# Patient Record
Sex: FEMALE | Race: WHITE | NOT HISPANIC OR LATINO | Employment: FULL TIME | URBAN - METROPOLITAN AREA
[De-identification: names, ages, dates, MRNs, and addresses within clinical notes are randomized per-mention and may not be internally consistent; named-entity substitution may affect disease eponyms.]

---

## 2017-05-08 ENCOUNTER — HOSPITAL ENCOUNTER (OUTPATIENT)
Dept: FAMILY MEDICINE CLINIC | Facility: CLINIC | Age: 42
Setting detail: SPECIMEN
Discharge: HOME | End: 2017-05-08
Attending: FAMILY MEDICINE | Admitting: FAMILY MEDICINE

## 2017-05-08 LAB
T3 SERPL-MCNC: 0.93 NG/ML (ref 0.87–1.78)
T4 FREE SERPL-MCNC: 0.84 NG/DL (ref 0.58–1.64)
T4 SERPL-MCNC: 8.35 UG/DL (ref 6.1–12.2)
TSH SERPL-ACNC: 3.98 UIU/ML (ref 0.34–5.6)

## 2017-05-15 ENCOUNTER — OFFICE (OUTPATIENT)
Dept: URBAN - METROPOLITAN AREA CLINIC 64 | Facility: CLINIC | Age: 42
End: 2017-05-15
Payer: COMMERCIAL

## 2017-05-15 VITALS
HEART RATE: 76 BPM | HEIGHT: 65 IN | DIASTOLIC BLOOD PRESSURE: 74 MMHG | SYSTOLIC BLOOD PRESSURE: 109 MMHG | WEIGHT: 160 LBS

## 2017-05-15 DIAGNOSIS — R14.0 ABDOMINAL DISTENSION (GASEOUS): ICD-10-CM

## 2017-05-15 DIAGNOSIS — K58.9 IRRITABLE BOWEL SYNDROME WITHOUT DIARRHEA: ICD-10-CM

## 2017-05-15 PROCEDURE — 99243 OFF/OP CNSLTJ NEW/EST LOW 30: CPT | Performed by: INTERNAL MEDICINE

## 2017-05-15 RX ORDER — DICYCLOMINE HYDROCHLORIDE 10 MG/1
30 CAPSULE ORAL
Qty: 90 | Refills: 11 | Status: COMPLETED
Start: 2017-05-15 | End: 2018-01-02

## 2017-05-15 RX ORDER — ALUMINUM ZIRCONIUM OCTACHLOROHYDREX GLY 16 G/100G
4 GEL TOPICAL
Qty: 30 | Refills: 5 | Status: COMPLETED
Start: 2017-05-15 | End: 2023-07-26

## 2017-06-07 ENCOUNTER — HOSPITAL ENCOUNTER (OUTPATIENT)
Dept: PAIN MEDICINE | Facility: HOSPITAL | Age: 42
Discharge: HOME | End: 2017-06-07
Attending: ANESTHESIOLOGY | Admitting: ANESTHESIOLOGY

## 2017-06-13 ENCOUNTER — HOSPITAL ENCOUNTER (OUTPATIENT)
Dept: PAIN MEDICINE | Facility: HOSPITAL | Age: 42
Discharge: HOME | End: 2017-06-13
Attending: ANESTHESIOLOGY | Admitting: ANESTHESIOLOGY

## 2017-07-05 ENCOUNTER — HOSPITAL ENCOUNTER (OUTPATIENT)
Dept: PAIN MEDICINE | Facility: HOSPITAL | Age: 42
Discharge: HOME | End: 2017-07-05
Attending: ANESTHESIOLOGY | Admitting: ANESTHESIOLOGY

## 2017-09-05 ENCOUNTER — HOSPITAL ENCOUNTER (OUTPATIENT)
Dept: PAIN MEDICINE | Facility: HOSPITAL | Age: 42
Discharge: HOME | End: 2017-09-05
Attending: ANESTHESIOLOGY | Admitting: ANESTHESIOLOGY

## 2017-10-17 ENCOUNTER — HOSPITAL ENCOUNTER (OUTPATIENT)
Dept: PAIN MEDICINE | Facility: HOSPITAL | Age: 42
Discharge: HOME | End: 2017-10-17
Attending: ANESTHESIOLOGY | Admitting: ANESTHESIOLOGY

## 2018-01-02 ENCOUNTER — OFFICE (OUTPATIENT)
Dept: URBAN - METROPOLITAN AREA CLINIC 64 | Facility: CLINIC | Age: 43
End: 2018-01-02

## 2018-01-02 VITALS
SYSTOLIC BLOOD PRESSURE: 109 MMHG | HEART RATE: 76 BPM | HEIGHT: 65 IN | DIASTOLIC BLOOD PRESSURE: 73 MMHG | WEIGHT: 161 LBS

## 2018-01-02 DIAGNOSIS — R14.0 ABDOMINAL DISTENSION (GASEOUS): ICD-10-CM

## 2018-01-02 DIAGNOSIS — K58.9 IRRITABLE BOWEL SYNDROME WITHOUT DIARRHEA: ICD-10-CM

## 2018-01-02 PROCEDURE — 99213 OFFICE O/P EST LOW 20 MIN: CPT | Performed by: INTERNAL MEDICINE

## 2018-01-02 NOTE — SERVICEHPINOTES
VELMA ORDOÑEZ is a 42 female c hx of IBS is being seen in the office today for gas and bloating. She has mild constipation. She mainly has gas and bloating discomfort. She has tried modifying diet. No wt loss, rectal bleeding or melena. No n/v.When last seen, she was started on Align and Bentyl. She states bentyl caused constipation when she was taking it tid. She is unsure if Align helped. She continues to have gas and bloating. She needs work form filled out for bathroom breaks.Yenny 2017 cbc, cmp nl, TSH 7

## 2018-11-09 ENCOUNTER — HOSPITAL ENCOUNTER (OUTPATIENT)
Dept: FAMILY MEDICINE CLINIC | Facility: CLINIC | Age: 43
Setting detail: SPECIMEN
Discharge: HOME | End: 2018-11-09
Attending: FAMILY MEDICINE | Admitting: FAMILY MEDICINE

## 2018-11-09 LAB
ALBUMIN SERPL-MCNC: 4 G/DL (ref 3.5–4.8)
ALBUMIN/GLOB SERPL: 1.3 {RATIO} (ref 1–1.7)
ALP SERPL-CCNC: 90 IU/L (ref 32–91)
ALT SERPL-CCNC: 24 IU/L (ref 14–54)
ANION GAP SERPL CALC-SCNC: 14 MMOL/L (ref 10–20)
AST SERPL-CCNC: 22 IU/L (ref 15–41)
BILIRUB SERPL-MCNC: 0.7 MG/DL (ref 0.3–1.2)
BUN SERPL-MCNC: 13 MG/DL (ref 8–20)
BUN/CREAT SERPL: 14.4 (ref 5.4–26.2)
CALCIUM SERPL-MCNC: 9.4 MG/DL (ref 8.9–10.3)
CHLORIDE SERPL-SCNC: 103 MMOL/L (ref 101–111)
CHOLEST SERPL-MCNC: 233 MG/DL
CHOLEST/HDLC SERPL: 5.7 {RATIO}
CONV CO2: 24 MMOL/L (ref 22–32)
CONV LDL CHOLESTEROL DIRECT: 151 MG/DL (ref 0–100)
CONV TOTAL PROTEIN: 7 G/DL (ref 6.1–7.9)
CREAT UR-MCNC: 0.9 MG/DL (ref 0.4–1)
GLOBULIN UR ELPH-MCNC: 3 G/DL (ref 2.5–3.8)
GLUCOSE SERPL-MCNC: 99 MG/DL (ref 65–99)
HDLC SERPL-MCNC: 41 MG/DL
LDLC/HDLC SERPL: 3.7 {RATIO}
LIPID INTERPRETATION: ABNORMAL
POTASSIUM SERPL-SCNC: 4 MMOL/L (ref 3.6–5.1)
SODIUM SERPL-SCNC: 137 MMOL/L (ref 136–144)
TRIGL SERPL-MCNC: 324 MG/DL
VLDLC SERPL CALC-MCNC: 41.8 MG/DL

## 2019-07-15 RX ORDER — IBUPROFEN 800 MG/1
1 TABLET ORAL EVERY 6 HOURS
COMMUNITY
Start: 2018-02-07 | End: 2019-09-03 | Stop reason: SDUPTHER

## 2019-07-15 NOTE — TELEPHONE ENCOUNTER
Last visit:  11/9/18  Next visit: none  Last labs: 11/9/18    Rx requested: Ibuprofen   [facility]: [facility] [place]

## 2019-07-23 RX ORDER — IBUPROFEN 800 MG/1
TABLET ORAL
Qty: 90 TABLET | Refills: 0 | OUTPATIENT
Start: 2019-07-23

## 2019-08-09 ENCOUNTER — CLINICAL SUPPORT (OUTPATIENT)
Dept: FAMILY MEDICINE CLINIC | Facility: CLINIC | Age: 44
End: 2019-08-09

## 2019-08-09 DIAGNOSIS — E78.2 MIXED HYPERLIPIDEMIA: Primary | ICD-10-CM

## 2019-08-09 DIAGNOSIS — E78.5 HYPERLIPIDEMIA, UNSPECIFIED HYPERLIPIDEMIA TYPE: ICD-10-CM

## 2019-08-09 LAB
ALBUMIN SERPL-MCNC: 4 G/DL (ref 3.5–4.8)
ALBUMIN/GLOB SERPL: 1.5 G/DL (ref 1–1.7)
ALP SERPL-CCNC: 72 U/L (ref 32–91)
ALT SERPL W P-5'-P-CCNC: 17 U/L (ref 14–54)
ANION GAP SERPL CALCULATED.3IONS-SCNC: 12.7 MMOL/L (ref 5–15)
ARTICHOKE IGE QN: 135 MG/DL (ref 0–100)
AST SERPL-CCNC: 22 U/L (ref 15–41)
BILIRUB SERPL-MCNC: 0.7 MG/DL (ref 0.3–1.2)
BUN BLD-MCNC: 15 MG/DL (ref 8–20)
BUN/CREAT SERPL: 15 (ref 5.4–26.2)
CALCIUM SPEC-SCNC: 9.1 MG/DL (ref 8.9–10.3)
CHLORIDE SERPL-SCNC: 108 MMOL/L (ref 101–111)
CHOLEST SERPL-MCNC: 200 MG/DL
CO2 SERPL-SCNC: 23 MMOL/L (ref 22–32)
CREAT BLD-MCNC: 1 MG/DL (ref 0.4–1)
GFR SERPL CREATININE-BSD FRML MDRD: 61 ML/MIN/1.73
GLOBULIN UR ELPH-MCNC: 2.6 GM/DL (ref 2.5–3.8)
GLUCOSE BLD-MCNC: 92 MG/DL (ref 65–99)
HDLC SERPL QL: 6.06
HDLC SERPL-MCNC: 33 MG/DL
LDLC/HDLC SERPL: 3.42 {RATIO}
POTASSIUM BLD-SCNC: 4.7 MMOL/L (ref 3.6–5.1)
PROT SERPL-MCNC: 6.6 G/DL (ref 6.1–7.9)
SODIUM BLD-SCNC: 139 MMOL/L (ref 136–144)
TRIGL SERPL-MCNC: 270 MG/DL
VLDLC SERPL-MCNC: 54 MG/DL

## 2019-08-09 PROCEDURE — 80061 LIPID PANEL: CPT | Performed by: FAMILY MEDICINE

## 2019-08-09 PROCEDURE — 80053 COMPREHEN METABOLIC PANEL: CPT | Performed by: FAMILY MEDICINE

## 2019-08-09 PROCEDURE — 36415 COLL VENOUS BLD VENIPUNCTURE: CPT | Performed by: FAMILY MEDICINE

## 2019-08-09 RX ORDER — CHLORZOXAZONE 500 MG/1
500 TABLET ORAL
COMMUNITY
End: 2019-08-30

## 2019-08-09 RX ORDER — HYDROXYZINE HYDROCHLORIDE 25 MG/1
25 TABLET, FILM COATED ORAL
COMMUNITY
Start: 2018-01-25 | End: 2019-08-30

## 2019-08-09 RX ORDER — SUMATRIPTAN 20 MG/1
SPRAY NASAL
COMMUNITY
Start: 2018-11-09 | End: 2019-08-09 | Stop reason: CLARIF

## 2019-08-09 RX ORDER — FAMOTIDINE 20 MG/1
20 TABLET, FILM COATED ORAL
COMMUNITY
Start: 2018-01-25 | End: 2019-08-30

## 2019-08-09 RX ORDER — ONDANSETRON 4 MG/1
4 TABLET, ORALLY DISINTEGRATING ORAL EVERY 8 HOURS PRN
Qty: 30 TABLET | Refills: 0 | Status: SHIPPED | OUTPATIENT
Start: 2019-08-09 | End: 2019-09-03 | Stop reason: SDUPTHER

## 2019-08-09 RX ORDER — DOXEPIN HYDROCHLORIDE 10 MG/1
1 CAPSULE ORAL EVERY 24 HOURS
COMMUNITY
Start: 2018-11-09 | End: 2020-02-20

## 2019-08-09 RX ORDER — AMITRIPTYLINE HYDROCHLORIDE 10 MG/1
10 TABLET, FILM COATED ORAL DAILY
COMMUNITY
End: 2019-08-30

## 2019-08-09 RX ORDER — ONDANSETRON 4 MG/1
4 TABLET, ORALLY DISINTEGRATING ORAL
COMMUNITY
End: 2019-08-09 | Stop reason: SDUPTHER

## 2019-08-09 RX ORDER — OCTISALATE, AVOBENZONE, HOMOSALATE, AND OCTOCRYLENE 29.4; 29.4; 49; 25.48 MG/ML; MG/ML; MG/ML; MG/ML
LOTION TOPICAL
COMMUNITY
Start: 2017-06-07 | End: 2019-08-30

## 2019-08-09 RX ORDER — TIZANIDINE 4 MG/1
4 TABLET ORAL
COMMUNITY
Start: 2017-06-07 | End: 2020-02-20 | Stop reason: SDUPTHER

## 2019-08-09 RX ORDER — MELOXICAM 15 MG/1
15 TABLET ORAL DAILY
COMMUNITY
Start: 2017-03-20 | End: 2019-09-04

## 2019-08-09 RX ORDER — RIZATRIPTAN BENZOATE 10 MG/1
10 TABLET, ORALLY DISINTEGRATING ORAL
COMMUNITY
Start: 2018-01-08 | End: 2019-08-09 | Stop reason: SDUPTHER

## 2019-08-09 RX ORDER — TRIAMCINOLONE ACETONIDE 1 MG/G
CREAM TOPICAL
COMMUNITY
Start: 2015-05-21 | End: 2020-02-20

## 2019-08-09 RX ORDER — RIZATRIPTAN BENZOATE 10 MG/1
10 TABLET, ORALLY DISINTEGRATING ORAL ONCE AS NEEDED
Qty: 9 TABLET | Refills: 0 | Status: SHIPPED | OUTPATIENT
Start: 2019-08-09 | End: 2019-08-30 | Stop reason: SDUPTHER

## 2019-08-09 RX ORDER — CYCLOBENZAPRINE HCL 10 MG
10 TABLET ORAL
COMMUNITY
Start: 2016-01-28 | End: 2019-08-30

## 2019-08-13 ENCOUNTER — TELEPHONE (OUTPATIENT)
Dept: FAMILY MEDICINE CLINIC | Facility: CLINIC | Age: 44
End: 2019-08-13

## 2019-08-13 DIAGNOSIS — G43.009 MIGRAINE WITHOUT AURA AND WITHOUT STATUS MIGRAINOSUS, NOT INTRACTABLE: Primary | ICD-10-CM

## 2019-08-30 ENCOUNTER — OFFICE VISIT (OUTPATIENT)
Dept: FAMILY MEDICINE CLINIC | Facility: CLINIC | Age: 44
End: 2019-08-30

## 2019-08-30 VITALS
RESPIRATION RATE: 14 BRPM | TEMPERATURE: 98.1 F | HEIGHT: 65 IN | SYSTOLIC BLOOD PRESSURE: 113 MMHG | HEART RATE: 74 BPM | WEIGHT: 165 LBS | DIASTOLIC BLOOD PRESSURE: 79 MMHG | BODY MASS INDEX: 27.49 KG/M2 | OXYGEN SATURATION: 96 %

## 2019-08-30 DIAGNOSIS — G43.109 MIGRAINE WITH AURA AND WITHOUT STATUS MIGRAINOSUS, NOT INTRACTABLE: Primary | ICD-10-CM

## 2019-08-30 PROBLEM — M47.817 OSTEOARTHRITIS OF LUMBOSACRAL SPINE WITHOUT MYELOPATHY: Status: ACTIVE | Noted: 2017-06-07

## 2019-08-30 PROBLEM — M51.36 DEGENERATION OF INTERVERTEBRAL DISC OF LUMBAR REGION: Status: ACTIVE | Noted: 2017-06-13

## 2019-08-30 PROBLEM — K58.9 IBS (IRRITABLE BOWEL SYNDROME): Status: ACTIVE | Noted: 2019-08-30

## 2019-08-30 PROBLEM — Z87.42 HX OF ENDOMETRIOSIS: Status: ACTIVE | Noted: 2017-05-01

## 2019-08-30 PROBLEM — R79.89 ELEVATED TSH: Status: ACTIVE | Noted: 2017-05-08

## 2019-08-30 PROBLEM — J02.9 SORE THROAT: Status: ACTIVE | Noted: 2017-11-29

## 2019-08-30 PROBLEM — R11.0 NAUSEA: Status: ACTIVE | Noted: 2017-05-01

## 2019-08-30 PROBLEM — G89.29 CHRONIC BACK PAIN: Status: ACTIVE | Noted: 2017-05-01

## 2019-08-30 PROBLEM — G89.29 CHRONIC PAIN: Status: ACTIVE | Noted: 2017-05-08

## 2019-08-30 PROBLEM — Z83.49 FAMILY HISTORY OF THYROID DISEASE: Status: ACTIVE | Noted: 2017-05-01

## 2019-08-30 PROBLEM — D64.9 ANEMIA: Status: ACTIVE | Noted: 2017-05-01

## 2019-08-30 PROBLEM — M79.7 FIBROMYOSITIS: Status: ACTIVE | Noted: 2017-06-07

## 2019-08-30 PROBLEM — G43.019 INTRACTABLE MIGRAINE WITHOUT AURA AND WITHOUT STATUS MIGRAINOSUS: Status: ACTIVE | Noted: 2018-01-08

## 2019-08-30 PROBLEM — Z12.31 VISIT FOR SCREENING MAMMOGRAM: Status: ACTIVE | Noted: 2018-11-09

## 2019-08-30 PROBLEM — R06.89 DIFFICULTY BREATHING: Status: ACTIVE | Noted: 2018-02-07

## 2019-08-30 PROBLEM — M54.16 LUMBAR RADICULITIS: Status: ACTIVE | Noted: 2017-06-13

## 2019-08-30 PROBLEM — J45.909 ASTHMA: Status: ACTIVE | Noted: 2017-05-01

## 2019-08-30 PROBLEM — M48.50XA COMPRESSION FRACTURE OF VERTEBRA (HCC): Status: ACTIVE | Noted: 2017-06-07

## 2019-08-30 PROBLEM — L50.1 URTICARIA, IDIOPATHIC: Status: ACTIVE | Noted: 2018-02-07

## 2019-08-30 PROBLEM — J40 BRONCHITIS: Status: ACTIVE | Noted: 2017-05-01

## 2019-08-30 PROBLEM — Z87.891 FORMER SMOKER: Status: ACTIVE | Noted: 2017-05-01

## 2019-08-30 PROBLEM — E78.1 HYPERTRIGLYCERIDEMIA: Status: ACTIVE | Noted: 2017-11-06

## 2019-08-30 PROBLEM — G43.909 MIGRAINE HEADACHE: Status: ACTIVE | Noted: 2017-05-01

## 2019-08-30 PROBLEM — Z86.2 HISTORY OF ANEMIA: Status: ACTIVE | Noted: 2017-05-01

## 2019-08-30 PROBLEM — M51.369 DEGENERATION OF INTERVERTEBRAL DISC OF LUMBAR REGION: Status: ACTIVE | Noted: 2017-06-13

## 2019-08-30 PROBLEM — M54.9 CHRONIC BACK PAIN: Status: ACTIVE | Noted: 2017-05-01

## 2019-08-30 PROBLEM — Z87.19 HX OF IRRITABLE BOWEL SYNDROME: Status: ACTIVE | Noted: 2017-05-01

## 2019-08-30 PROBLEM — Z86.69 HISTORY OF MIGRAINE HEADACHES: Status: ACTIVE | Noted: 2017-05-01

## 2019-08-30 PROBLEM — R05.9 COUGH: Status: ACTIVE | Noted: 2017-11-29

## 2019-08-30 PROBLEM — G47.00 INSOMNIA, PERSISTENT: Status: ACTIVE | Noted: 2018-11-09

## 2019-08-30 PROBLEM — Z12.39 ENCOUNTER FOR SCREENING FOR MALIGNANT NEOPLASM OF BREAST: Status: ACTIVE | Noted: 2017-11-06

## 2019-08-30 PROBLEM — R14.3 FLATULENCE: Status: ACTIVE | Noted: 2017-05-01

## 2019-08-30 PROBLEM — R04.0 NOSEBLEED: Status: ACTIVE | Noted: 2017-05-01

## 2019-08-30 PROBLEM — R21 RASH AND OTHER NONSPECIFIC SKIN ERUPTION: Status: ACTIVE | Noted: 2017-08-07

## 2019-08-30 PROBLEM — Z87.09 HISTORY OF ASTHMA: Status: ACTIVE | Noted: 2017-05-01

## 2019-08-30 PROBLEM — F32.A DEPRESSION: Status: ACTIVE | Noted: 2017-05-01

## 2019-08-30 PROCEDURE — 99213 OFFICE O/P EST LOW 20 MIN: CPT | Performed by: FAMILY MEDICINE

## 2019-08-30 RX ORDER — TOPIRAMATE 25 MG/1
1 CAPSULE, EXTENDED RELEASE ORAL DAILY
Qty: 30 CAPSULE | Refills: 1 | Status: SHIPPED | OUTPATIENT
Start: 2019-08-30 | End: 2020-02-20

## 2019-08-30 RX ORDER — RIZATRIPTAN BENZOATE 10 MG/1
10 TABLET, ORALLY DISINTEGRATING ORAL ONCE AS NEEDED
Qty: 9 TABLET | Refills: 2 | Status: SHIPPED | OUTPATIENT
Start: 2019-08-30 | End: 2019-11-19 | Stop reason: SDUPTHER

## 2019-08-31 NOTE — PROGRESS NOTES
Subjective   Taylor Cat is a 43 y.o. female.     Chief Complaint   Patient presents with   • Migraine     patient is here for [place] paperwork for the migraines.          Current Outpatient Medications:   •  doxepin (SINEquan) 10 MG capsule, 1 capsule Daily., Disp: , Rfl:   •  ibuprofen (ADVIL,MOTRIN) 800 MG tablet, Take 1 tablet by mouth Every 6 (Six) Hours. As needed for pain, Disp: , Rfl:   •  meloxicam (MOBIC) 15 MG tablet, Take 15 mg by mouth Daily., Disp: , Rfl:   •  ondansetron ODT (ZOFRAN-ODT) 4 MG disintegrating tablet, Take 1 tablet by mouth Every 8 (Eight) Hours As Needed for Nausea or Vomiting., Disp: 30 tablet, Rfl: 0  •  rizatriptan MLT (MAXALT-MLT) 10 MG disintegrating tablet, Take 1 tablet by mouth 1 (One) Time As Needed for Migraine (May repeat every 2 hrs, max #3 per day) for up to 1 dose., Disp: 9 tablet, Rfl: 2  •  tiZANidine (ZANAFLEX) 4 MG tablet, Take 4 mg by mouth., Disp: , Rfl:   •  triamcinolone (KENALOG) 0.1 % cream, Apply  topically to the appropriate area as directed., Disp: , Rfl:   •  Topiramate ER (TROKENDI XR) 25 MG capsule sustained-release 24 hr, Take 1 capsule by mouth Daily., Disp: 30 capsule, Rfl: 1    Past Medical History:   Diagnosis Date   • Anemia    • Asthma    • Bronchitis    • Chronic back pain    • Chronic pain    • Closed compression fracture of lumbosacral spine (CMS/HCC)    • DDD (degenerative disc disease), lumbar    • Depression    • Endometriosis    • Former smoker    • Hypothyroidism    • IBS (irritable bowel syndrome)    • Insomnia    • Lumbar radiculitis    • Myofasciitis    • Nausea    • Spondylosis        Past Surgical History:   Procedure Laterality Date   • CYST REMOVAL     • D&C AND LAPAROSCOPY     • HYSTERECTOMY         Family History   Problem Relation Age of Onset   • Arthritis Mother    • Diabetes Mother    • Heart disease Mother    • Cancer Mother    • Mental illness Father    • Stroke Father    • Heart disease Father        Social History      Socioeconomic History   • Marital status:      Spouse name: Not on file   • Number of children: Not on file   • Years of education: Not on file   • Highest education level: Not on file   Tobacco Use   • Smoking status: Former Smoker   • Smokeless tobacco: Never Used   Substance and Sexual Activity   • Alcohol use: No     Frequency: Never   • Drug use: No   • Sexual activity: Defer       42 y/o C female here for f/u on migraines and needing FMLA paperwork filled out; Pt also states she has a neuro appt for her Has coming up soon to see if there is something else she can take to decrease her migraines         The following portions of the patient's history were reviewed and updated as appropriate: allergies, current medications, past family history, past medical history, past social history, past surgical history and problem list.    Review of Systems   HENT:        Phonophobia   Eyes: Positive for photophobia. Negative for blurred vision and visual disturbance.   Neurological: Positive for headache. Negative for speech difficulty, weakness and numbness.       Vitals:    08/30/19 1516   BP: 113/79   Pulse: 74   Resp: 14   Temp: 98.1 °F (36.7 °C)   SpO2: 96%       Objective   Physical Exam   Constitutional: She is oriented to person, place, and time. She appears well-developed and well-nourished. No distress.   HENT:   Head: Normocephalic and atraumatic.   Neurological: She is alert and oriented to person, place, and time. No cranial nerve deficit.   Psychiatric: She has a normal mood and affect. Her behavior is normal. Judgment and thought content normal.   Nursing note and vitals reviewed.        Assessment/Plan   Taylor was seen today for migraine.    Diagnoses and all orders for this visit:    Migraine with aura and without status migrainosus, not intractable    Other orders  -     rizatriptan MLT (MAXALT-MLT) 10 MG disintegrating tablet; Take 1 tablet by mouth 1 (One) Time As Needed for Migraine (May  repeat every 2 hrs, max #3 per day) for up to 1 dose.  -     Topiramate ER (TROKENDI XR) 25 MG capsule sustained-release 24 hr; Take 1 capsule by mouth Daily.    trial of daily Topamax XR 25mg qday and f/u w/ neuro

## 2019-09-04 RX ORDER — IBUPROFEN 800 MG/1
TABLET ORAL
Qty: 90 TABLET | Refills: 0 | Status: SHIPPED | OUTPATIENT
Start: 2019-09-04 | End: 2019-09-27 | Stop reason: SDUPTHER

## 2019-09-04 RX ORDER — ONDANSETRON 4 MG/1
TABLET, ORALLY DISINTEGRATING ORAL
Qty: 30 TABLET | Refills: 0 | Status: SHIPPED | OUTPATIENT
Start: 2019-09-04 | End: 2019-10-28 | Stop reason: SDUPTHER

## 2019-09-04 NOTE — TELEPHONE ENCOUNTER
Last visit:  8/30/19  Next visit: none  Last labs: 8/9/19    Rx requested: Ondansetron & Ibuprofen  [facility] [place]

## 2019-09-27 RX ORDER — IBUPROFEN 800 MG/1
TABLET ORAL
Qty: 90 TABLET | Refills: 0 | Status: SHIPPED | OUTPATIENT
Start: 2019-09-27 | End: 2019-10-16 | Stop reason: SDUPTHER

## 2019-10-16 RX ORDER — IBUPROFEN 800 MG/1
TABLET ORAL
Qty: 90 TABLET | Refills: 0 | Status: SHIPPED | OUTPATIENT
Start: 2019-10-16 | End: 2019-11-19 | Stop reason: SDUPTHER

## 2019-10-28 RX ORDER — ONDANSETRON 4 MG/1
TABLET, ORALLY DISINTEGRATING ORAL
Qty: 30 TABLET | Refills: 0 | Status: SHIPPED | OUTPATIENT
Start: 2019-10-28 | End: 2019-11-19 | Stop reason: SDUPTHER

## 2019-10-28 NOTE — TELEPHONE ENCOUNTER
Last visit: 8/30/19  Next visit: none   Last labs: 8/9/19    Rx requested:ondansetron  [facility] [place]

## 2019-11-19 RX ORDER — IBUPROFEN 800 MG/1
TABLET ORAL
Qty: 90 TABLET | Refills: 0 | Status: SHIPPED | OUTPATIENT
Start: 2019-11-19 | End: 2021-05-20

## 2019-11-19 RX ORDER — ONDANSETRON 4 MG/1
TABLET, ORALLY DISINTEGRATING ORAL
Qty: 30 TABLET | Refills: 0 | Status: SHIPPED | OUTPATIENT
Start: 2019-11-19 | End: 2021-05-20 | Stop reason: SDUPTHER

## 2019-11-19 RX ORDER — RIZATRIPTAN BENZOATE 10 MG/1
TABLET, ORALLY DISINTEGRATING ORAL
Qty: 6 TABLET | Refills: 3 | Status: SHIPPED | OUTPATIENT
Start: 2019-11-19

## 2020-02-20 ENCOUNTER — OFFICE VISIT (OUTPATIENT)
Dept: FAMILY MEDICINE CLINIC | Facility: CLINIC | Age: 45
End: 2020-02-20

## 2020-02-20 VITALS
BODY MASS INDEX: 26.33 KG/M2 | HEIGHT: 65 IN | SYSTOLIC BLOOD PRESSURE: 117 MMHG | DIASTOLIC BLOOD PRESSURE: 81 MMHG | RESPIRATION RATE: 14 BRPM | OXYGEN SATURATION: 98 % | HEART RATE: 65 BPM | TEMPERATURE: 97.8 F | WEIGHT: 158 LBS

## 2020-02-20 DIAGNOSIS — M51.36 DEGENERATION OF INTERVERTEBRAL DISC OF LUMBAR REGION: Primary | ICD-10-CM

## 2020-02-20 DIAGNOSIS — R73.9 HYPERGLYCEMIA: ICD-10-CM

## 2020-02-20 DIAGNOSIS — Z12.31 ENCOUNTER FOR SCREENING MAMMOGRAM FOR BREAST CANCER: ICD-10-CM

## 2020-02-20 DIAGNOSIS — G43.C0 PERIODIC HEADACHE SYNDROME, NOT INTRACTABLE: ICD-10-CM

## 2020-02-20 PROCEDURE — 99213 OFFICE O/P EST LOW 20 MIN: CPT | Performed by: FAMILY MEDICINE

## 2020-02-20 RX ORDER — ACETAMINOPHEN, ASPIRIN AND CAFFEINE 250; 250; 65 MG/1; MG/1; MG/1
1 TABLET, FILM COATED ORAL EVERY 6 HOURS PRN
COMMUNITY

## 2020-02-20 RX ORDER — TOPIRAMATE 50 MG/1
CAPSULE, EXTENDED RELEASE ORAL
COMMUNITY
Start: 2020-01-27 | End: 2020-11-20

## 2020-02-20 RX ORDER — TIZANIDINE 4 MG/1
4 TABLET ORAL NIGHTLY PRN
Qty: 30 TABLET | Refills: 0 | Status: SHIPPED | OUTPATIENT
Start: 2020-02-20 | End: 2020-03-16

## 2020-02-20 NOTE — PROGRESS NOTES
Subjective   Taylor Cat is a 44 y.o. female.     Chief Complaint   Patient presents with   • Back Pain     FMLA paperwork, patient is aware of the fee.          Current Outpatient Medications:   •  aspirin-acetaminophen-caffeine (EXCEDRIN MIGRAINE) 250-250-65 MG per tablet, Take 1 tablet by mouth Every 6 (Six) Hours As Needed for Headache., Disp: , Rfl:   •  ibuprofen (ADVIL,MOTRIN) 800 MG tablet, TAKE 1 TABLET BY MOUTH EVERY 6 HOURS AS NEEDED FOR PAIN, Disp: 90 tablet, Rfl: 0  •  ondansetron ODT (ZOFRAN-ODT) 4 MG disintegrating tablet, TAKE 1 TABLET BY MOUTH EVERY 8 HOURS AS NEEDED FOR NAUSEA OR VOMITING., Disp: 30 tablet, Rfl: 0  •  Probiotic Product (PROBIOTIC ADVANCED PO), Take 1 capsule by mouth Daily., Disp: , Rfl:   •  rizatriptan MLT (MAXALT-MLT) 10 MG disintegrating tablet, TAKE 1 TABLET BY MOUTH AS NEEDED FOR MIGRAINES, MAY REPEAT EVERY 2 HOURS, MAX 3 A DAY, Disp: 6 tablet, Rfl: 3  •  tiZANidine (ZANAFLEX) 4 MG tablet, Take 1 tablet by mouth At Night As Needed for Muscle Spasms., Disp: 30 tablet, Rfl: 0  •  TROKENDI XR 50 MG capsule sustained-release 24 hr, Take  by mouth every night at bedtime., Disp: , Rfl:     Past Medical History:   Diagnosis Date   • Anemia    • Asthma    • Chronic back pain    • Chronic pain    • DDD (degenerative disc disease), lumbar    • Depression    • Endometriosis    • Headache    • History of vertebral fracture     T12 from trauma   • IBS (irritable bowel syndrome)    • Insomnia    • Low back pain     DDD w/ Radiulitis   • Lumbar radiculitis    • MAMMO     NEG = 2016   Dr. Crooks   • Myofasciitis    • PAP     NEG = 2019   • Spondylosis        Past Surgical History:   Procedure Laterality Date   • CYST REMOVAL      Neck cyst sx as child   • D&C AND LAPAROSCOPY     • HYSTERECTOMY     • SUBTOTAL HYSTERECTOMY         Family History   Problem Relation Age of Onset   • Arthritis Mother    • Heart disease Mother    • Cancer Mother         Cervical Cancer   • Thyroid disease  Mother    • Stroke Father    • Heart disease Father    • Alcohol abuse Father    • Irritable bowel syndrome Brother    • Thyroid disease Maternal Grandfather    • Heart disease Paternal Grandfather         CABG       Social History     Socioeconomic History   • Marital status:      Spouse name: Not on file   • Number of children: Not on file   • Years of education: Not on file   • Highest education level: Not on file   Tobacco Use   • Smoking status: Former Smoker     Packs/day: 0.25     Types: Cigarettes     Last attempt to quit: 2010     Years since quitting: 10.1   • Smokeless tobacco: Never Used   Substance and Sexual Activity   • Alcohol use: No     Frequency: Never   • Drug use: No   • Sexual activity: Defer       45 y/o C female w/ chronic low back pain here for work restriction paperwork    Pt states they changed computer systems and HR states a lot of the forms didn't cross over; now everyone has to get their paperwork redone for any work restrictions.  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    Review of Systems   Musculoskeletal: Positive for arthralgias, back pain and myalgias. Negative for gait problem and joint swelling.   Neurological: Positive for headache.       Vitals:    02/20/20 0948   BP: 117/81   Pulse: 65   Resp: 14   Temp: 97.8 °F (36.6 °C)   SpO2: 98%       Objective   Physical Exam   Constitutional: She is oriented to person, place, and time. She appears well-developed and well-nourished.   HENT:   Head: Normocephalic and atraumatic.   Neck: Normal range of motion. Neck supple.   Cardiovascular: Normal rate, regular  rhythm, normal heart sounds and intact distal pulses.   No murmur heard.  Pulmonary/Chest: Effort normal and breath sounds normal.   Musculoskeletal: She exhibits no edema.   Neurological: She is alert and oriented to person, place, and time. No cranial nerve deficit.   Skin: Skin is warm and dry. No rash noted.   Psychiatric: She has a normal mood and affect. Her behavior is normal. Judgment and thought content normal.   Nursing note and vitals reviewed.        Assessment/Plan   Taylor was seen today for back pain.    Diagnoses and all orders for this visit:    Degeneration of intervertebral disc of lumbar region    Periodic headache syndrome, not intractable    Hyperglycemia  -     POC Glycosylated Hemoglobin (Hb A1C)    Encounter for screening mammogram for breast cancer  -     Mammo Screening Digital Tomosynthesis Bilateral With CAD; Future    Other orders  -     tiZANidine (ZANAFLEX) 4 MG tablet; Take 1 tablet by mouth At Night As Needed for Muscle Spasms.      Paperwork done during appt

## 2020-03-16 RX ORDER — TIZANIDINE 4 MG/1
4 TABLET ORAL NIGHTLY PRN
Qty: 30 TABLET | Refills: 0 | Status: SHIPPED | OUTPATIENT
Start: 2020-03-16 | End: 2020-03-30 | Stop reason: SDUPTHER

## 2020-03-30 RX ORDER — TIZANIDINE 4 MG/1
4 TABLET ORAL NIGHTLY PRN
Qty: 90 TABLET | Refills: 0 | Status: SHIPPED | OUTPATIENT
Start: 2020-03-30 | End: 2020-06-29

## 2020-03-30 NOTE — TELEPHONE ENCOUNTER
Tizanidine 4mg     **Requesting 90 day supply    [facility]    Last Seen 2/20/2020  No future appt sched.

## 2020-04-03 ENCOUNTER — TELEPHONE (OUTPATIENT)
Dept: FAMILY MEDICINE CLINIC | Facility: CLINIC | Age: 45
End: 2020-04-03

## 2020-04-03 NOTE — TELEPHONE ENCOUNTER
Pt wants to know if you can agree to take her off work due to her asthma.  She is due to go back to work on Sunday and feels its not safe with the new positive that was reported at [facility].  States she has kids at home and she doesn't want to take it home to them.  Last seen 8/19

## 2020-05-05 ENCOUNTER — TELEPHONE (OUTPATIENT)
Dept: FAMILY MEDICINE CLINIC | Facility: CLINIC | Age: 45
End: 2020-05-05

## 2020-05-05 RX ORDER — ALBUTEROL SULFATE 90 UG/1
2 AEROSOL, METERED RESPIRATORY (INHALATION) EVERY 6 HOURS PRN
Qty: 18 G | Refills: 0
Start: 2020-05-05

## 2020-05-05 NOTE — TELEPHONE ENCOUNTER
Pt states she got shelton of her allergist and has a video appt sched this wk w them.  She will let us know what med he puts her on, bc he might change it from the Albuterol HFA

## 2020-05-05 NOTE — TELEPHONE ENCOUNTER
Pt states [facility] is not answering their calls and she needs a note for her employer ([employer]) stating she has asthma.  Allergist controls and gives her the meds for asthma.  Last seen 2/20

## 2020-06-29 RX ORDER — TIZANIDINE 4 MG/1
4 TABLET ORAL NIGHTLY PRN
Qty: 90 TABLET | Refills: 0 | Status: SHIPPED | OUTPATIENT
Start: 2020-06-29 | End: 2021-05-20

## 2020-11-04 ENCOUNTER — TELEPHONE (OUTPATIENT)
Dept: FAMILY MEDICINE CLINIC | Facility: CLINIC | Age: 45
End: 2020-11-04

## 2020-11-04 DIAGNOSIS — R73.9 HYPERGLYCEMIA: ICD-10-CM

## 2020-11-04 DIAGNOSIS — E78.2 MIXED HYPERLIPIDEMIA: Primary | ICD-10-CM

## 2020-11-06 ENCOUNTER — RESULTS ENCOUNTER (OUTPATIENT)
Dept: FAMILY MEDICINE CLINIC | Facility: CLINIC | Age: 45
End: 2020-11-06

## 2020-11-06 DIAGNOSIS — R73.9 HYPERGLYCEMIA: ICD-10-CM

## 2020-11-06 DIAGNOSIS — E78.2 MIXED HYPERLIPIDEMIA: ICD-10-CM

## 2020-11-20 ENCOUNTER — OFFICE VISIT (OUTPATIENT)
Dept: FAMILY MEDICINE CLINIC | Facility: CLINIC | Age: 45
End: 2020-11-20

## 2020-11-20 VITALS
DIASTOLIC BLOOD PRESSURE: 77 MMHG | BODY MASS INDEX: 27.66 KG/M2 | SYSTOLIC BLOOD PRESSURE: 109 MMHG | RESPIRATION RATE: 16 BRPM | OXYGEN SATURATION: 99 % | HEIGHT: 65 IN | WEIGHT: 166 LBS | TEMPERATURE: 98 F | HEART RATE: 77 BPM

## 2020-11-20 DIAGNOSIS — Z00.00 ANNUAL PHYSICAL EXAM: Primary | ICD-10-CM

## 2020-11-20 DIAGNOSIS — M25.531 WRIST PAIN, CHRONIC, RIGHT: ICD-10-CM

## 2020-11-20 DIAGNOSIS — K58.1 IRRITABLE BOWEL SYNDROME WITH CONSTIPATION: ICD-10-CM

## 2020-11-20 DIAGNOSIS — G89.29 WRIST PAIN, CHRONIC, RIGHT: ICD-10-CM

## 2020-11-20 LAB
BILIRUB BLD-MCNC: NEGATIVE MG/DL
CLARITY, POC: CLEAR
COLOR UR: YELLOW
GLUCOSE UR STRIP-MCNC: NEGATIVE MG/DL
KETONES UR QL: NEGATIVE
LEUKOCYTE EST, POC: NEGATIVE
NITRITE UR-MCNC: NEGATIVE MG/ML
PH UR: 6 [PH] (ref 5–8)
PROT UR STRIP-MCNC: NEGATIVE MG/DL
RBC # UR STRIP: NEGATIVE /UL
SP GR UR: 1.03 (ref 1–1.03)
UROBILINOGEN UR QL: NORMAL

## 2020-11-20 PROCEDURE — 81003 URINALYSIS AUTO W/O SCOPE: CPT | Performed by: FAMILY MEDICINE

## 2020-11-20 PROCEDURE — 99396 PREV VISIT EST AGE 40-64: CPT | Performed by: FAMILY MEDICINE

## 2020-11-20 RX ORDER — FREMANEZUMAB-VFRM 225 MG/1.5ML
INJECTION SUBCUTANEOUS
COMMUNITY
End: 2021-06-14 | Stop reason: ALTCHOICE

## 2020-11-20 NOTE — PROGRESS NOTES
Subjective   Taylor Cat is a 45 y.o. female.     Chief Complaint   Patient presents with   • Annual Exam   • Wrist Pain     right wrist pain [id number]. patient works at [employer].         Current Outpatient Medications:   •  albuterol sulfate  (90 Base) MCG/ACT inhaler, Inhale 2 puffs Every 6 (Six) Hours As Needed for Wheezing or Shortness of Air., Disp: 18 g, Rfl: 0  •  aspirin-acetaminophen-caffeine (EXCEDRIN MIGRAINE) 250-250-65 MG per tablet, Take 1 tablet by mouth Every 6 (Six) Hours As Needed for Headache., Disp: , Rfl:   •  Fremanezumab-vfrm (Ajovy) 225 MG/1.5ML solution prefilled syringe, Inject SubQ once a month for migraine prevention., Disp: , Rfl:   •  ibuprofen (ADVIL,MOTRIN) 800 MG tablet, TAKE 1 TABLET BY MOUTH EVERY 6 HOURS AS NEEDED FOR PAIN, Disp: 90 tablet, Rfl: 0  •  ondansetron ODT (ZOFRAN-ODT) 4 MG disintegrating tablet, TAKE 1 TABLET BY MOUTH EVERY 8 HOURS AS NEEDED FOR NAUSEA OR VOMITING., Disp: 30 tablet, Rfl: 0  •  Probiotic Product (PROBIOTIC ADVANCED PO), Take 1 capsule by mouth Daily., Disp: , Rfl:   •  rizatriptan MLT (MAXALT-MLT) 10 MG disintegrating tablet, TAKE 1 TABLET BY MOUTH AS NEEDED FOR MIGRAINES, MAY REPEAT EVERY 2 HOURS, MAX 3 A DAY, Disp: 6 tablet, Rfl: 3  •  tiZANidine (ZANAFLEX) 4 MG tablet, TAKE 1 TABLET BY MOUTH AT NIGHT AS NEEDED FOR MUSCLE SPASMS., Disp: 90 tablet, Rfl: 0  •  linaclotide (Linzess) 72 MCG capsule capsule, Take 1 capsule by mouth Every Morning Before Breakfast., Disp: 90 capsule, Rfl: 0    Past Medical History:   Diagnosis Date   • Anemia    • Asthma    • Chronic back pain    • Chronic pain    • DDD (degenerative disc disease), lumbar    • Depression    • Endometriosis    • History of vertebral fracture     T12 from trauma   • IBS (irritable bowel syndrome)    • Insomnia    • Low back pain     DDD w/ Radiulitis   • Lumbar radiculitis    • MAMMO     NEG = 2016/ 2020   Dr. Crooks   • Myofasciitis    • PAP     NEG = 2019   • Spondylosis         Past Surgical History:   Procedure Laterality Date   • CYST REMOVAL      Neck cyst sx as child   • D&C AND LAPAROSCOPY     • HYSTERECTOMY     • SUBTOTAL HYSTERECTOMY         Family History   Problem Relation Age of Onset   • Arthritis Mother    • Heart disease Mother    • Cancer Mother         Cervical Cancer   • Thyroid disease Mother    • Stroke Father    • Heart disease Father    • Alcohol abuse Father    • Irritable bowel syndrome Brother    • Thyroid disease Maternal Grandfather    • Heart disease Paternal Grandfather         CABG       Social History     Socioeconomic History   • Marital status:      Spouse name: Not on file   • Number of children: Not on file   • Years of education: Not on file   • Highest education level: Not on file   Tobacco Use   • Smoking status: Former Smoker     Packs/day: 0.25     Types: Cigarettes     Quit date: 2010     Years since quitting: 10.8   • Smokeless tobacco: Never Used   Substance and Sexual Activity   • Alcohol use: No     Frequency: Never   • Drug use: No   • Sexual activity: Defer       46 y/o C female here for annual PE w/o pap     Pt states she has been having Rt wrist pain and off/on feeling of weakness x 2 months. 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    Review of Systems   Constitutional: Negative for activity change,  appetite change, fatigue, unexpected weight gain and unexpected weight loss.   HENT: Negative for congestion, ear pain, hearing loss, nosebleeds, postnasal drip, rhinorrhea, sinus pressure, sneezing, sore throat, tinnitus and trouble swallowing.    Eyes: Negative for blurred vision and double vision.   Respiratory: Negative for cough and shortness of breath.    Cardiovascular: Negative for chest pain.   Gastrointestinal: Positive for abdominal distention and constipation. Negative for abdominal pain, diarrhea, nausea, vomiting, GERD and indigestion.        Excessive flatus   Genitourinary: Negative for difficulty urinating, dysuria, flank pain, frequency, urgency and urinary incontinence.   Musculoskeletal: Negative for arthralgias and myalgias.   Skin: Negative for rash and skin lesions.   Neurological: Negative for weakness, memory problem and confusion.   Psychiatric/Behavioral: Negative for agitation, self-injury, suicidal ideas, depressed mood and stress. The patient is not nervous/anxious.        Vitals:    11/20/20 1442   BP: 109/77   Pulse: 77   Resp: 16   Temp: 98 °F (36.7 °C)   SpO2: 99%       Objective   Physical Exam  Vitals signs and nursing note reviewed.   Constitutional:       General: She is not in acute distress.     Appearance: Normal appearance. She is well-developed. She is not ill-appearing, toxic-appearing or diaphoretic.   HENT:      Head: Normocephalic and atraumatic.      Right Ear: Tympanic membrane, ear canal and external ear normal. There is no impacted cerumen.      Left Ear: Tympanic membrane, ear canal and external ear normal. There is no impacted cerumen.      Nose: Nose normal. No congestion or rhinorrhea.      Mouth/Throat:      Mouth: Mucous membranes are moist.      Pharynx: No oropharyngeal exudate or posterior oropharyngeal erythema.   Eyes:      General:         Right eye: No discharge.         Left eye: No discharge.      Extraocular Movements: Extraocular movements intact.       Conjunctiva/sclera: Conjunctivae normal.      Pupils: Pupils are equal, round, and reactive to light.   Neck:      Musculoskeletal: Normal range of motion and neck supple.   Cardiovascular:      Rate and Rhythm: Normal rate and regular rhythm.      Heart sounds: Normal heart sounds. No murmur.   Pulmonary:      Effort: Pulmonary effort is normal. No respiratory distress.      Breath sounds: Normal breath sounds. No wheezing.   Abdominal:      General: Bowel sounds are normal. There is no distension.      Palpations: Abdomen is soft. There is no mass.      Tenderness: There is no abdominal tenderness. There is no guarding or rebound.      Hernia: No hernia is present.   Musculoskeletal: Normal range of motion.      Right wrist: She exhibits normal range of motion, no tenderness, no bony tenderness, no swelling, no effusion, no deformity and no laceration.        Arms:       Right lower leg: No edema.      Left lower leg: No edema.   Lymphadenopathy:      Cervical: No cervical adenopathy.   Skin:     General: Skin is warm and dry.      Findings: No rash.   Neurological:      General: No focal deficit present.      Mental Status: She is alert and oriented to person, place, and time.      Cranial Nerves: No cranial nerve deficit.      Gait: Gait normal.   Psychiatric:         Mood and Affect: Mood normal.         Behavior: Behavior normal.         Thought Content: Thought content normal.         Judgment: Judgment normal.           Assessment/Plan   Diagnoses and all orders for this visit:    1. Annual physical exam (Primary)  -     POC Urinalysis Dipstick, Automated    2. Irritable bowel syndrome with constipation    3. Wrist pain, chronic, right    Other orders  -     linaclotide (Linzess) 72 MCG capsule capsule; Take 1 capsule by mouth Every Morning Before Breakfast.  Dispense: 90 capsule; Refill: 0      TRIAL of CT Brace Rt wrist at all times except in shower  TRIAL Linzess qday to regulate BMs  Fasting labs  soon

## 2020-11-21 PROBLEM — G43.019 INTRACTABLE MIGRAINE WITHOUT AURA AND WITHOUT STATUS MIGRAINOSUS: Status: RESOLVED | Noted: 2018-01-08 | Resolved: 2020-11-21

## 2020-11-21 PROBLEM — R05.9 COUGH: Status: RESOLVED | Noted: 2017-11-29 | Resolved: 2020-11-21

## 2021-05-20 ENCOUNTER — OFFICE VISIT (OUTPATIENT)
Dept: FAMILY MEDICINE CLINIC | Facility: CLINIC | Age: 46
End: 2021-05-20

## 2021-05-20 VITALS
WEIGHT: 167 LBS | DIASTOLIC BLOOD PRESSURE: 71 MMHG | OXYGEN SATURATION: 98 % | SYSTOLIC BLOOD PRESSURE: 110 MMHG | TEMPERATURE: 98.2 F | RESPIRATION RATE: 18 BRPM | BODY MASS INDEX: 27.82 KG/M2 | HEART RATE: 75 BPM | HEIGHT: 65 IN

## 2021-05-20 DIAGNOSIS — M51.36 DEGENERATION OF INTERVERTEBRAL DISC OF LUMBAR REGION: Primary | ICD-10-CM

## 2021-05-20 DIAGNOSIS — M54.50 CHRONIC MIDLINE LOW BACK PAIN WITHOUT SCIATICA: ICD-10-CM

## 2021-05-20 DIAGNOSIS — R73.9 HYPERGLYCEMIA: ICD-10-CM

## 2021-05-20 DIAGNOSIS — G89.29 CHRONIC MIDLINE LOW BACK PAIN WITHOUT SCIATICA: ICD-10-CM

## 2021-05-20 DIAGNOSIS — E78.2 MIXED HYPERLIPIDEMIA: ICD-10-CM

## 2021-05-20 PROCEDURE — 80053 COMPREHEN METABOLIC PANEL: CPT | Performed by: FAMILY MEDICINE

## 2021-05-20 PROCEDURE — 80061 LIPID PANEL: CPT | Performed by: FAMILY MEDICINE

## 2021-05-20 PROCEDURE — 99213 OFFICE O/P EST LOW 20 MIN: CPT | Performed by: FAMILY MEDICINE

## 2021-05-20 RX ORDER — METHOCARBAMOL 500 MG/1
TABLET, FILM COATED ORAL
Qty: 30 TABLET | Refills: 1 | Status: SHIPPED | OUTPATIENT
Start: 2021-05-20 | End: 2021-08-09

## 2021-05-20 RX ORDER — ONDANSETRON 4 MG/1
4 TABLET, ORALLY DISINTEGRATING ORAL EVERY 8 HOURS PRN
Qty: 30 TABLET | Refills: 0 | Status: SHIPPED | OUTPATIENT
Start: 2021-05-20 | End: 2021-08-09

## 2021-05-20 RX ORDER — METHYLPREDNISOLONE 4 MG/1
TABLET ORAL
Qty: 21 TABLET | Refills: 0 | Status: SHIPPED | OUTPATIENT
Start: 2021-05-20 | End: 2021-11-29

## 2021-05-21 ENCOUNTER — TELEPHONE (OUTPATIENT)
Dept: FAMILY MEDICINE CLINIC | Facility: CLINIC | Age: 46
End: 2021-05-21

## 2021-05-21 LAB
ALBUMIN SERPL-MCNC: 4.3 G/DL (ref 3.5–5.2)
ALBUMIN/GLOB SERPL: 1.7 G/DL
ALP SERPL-CCNC: 83 U/L (ref 39–117)
ALT SERPL W P-5'-P-CCNC: 13 U/L (ref 1–33)
ANION GAP SERPL CALCULATED.3IONS-SCNC: 6.1 MMOL/L (ref 5–15)
AST SERPL-CCNC: 17 U/L (ref 1–32)
BILIRUB SERPL-MCNC: 0.3 MG/DL (ref 0–1.2)
BUN SERPL-MCNC: 12 MG/DL (ref 6–20)
BUN/CREAT SERPL: 14.6 (ref 7–25)
CALCIUM SPEC-SCNC: 8.9 MG/DL (ref 8.6–10.5)
CHLORIDE SERPL-SCNC: 108 MMOL/L (ref 98–107)
CHOLEST SERPL-MCNC: 192 MG/DL (ref 0–200)
CO2 SERPL-SCNC: 24.9 MMOL/L (ref 22–29)
CREAT SERPL-MCNC: 0.82 MG/DL (ref 0.57–1)
GFR SERPL CREATININE-BSD FRML MDRD: 75 ML/MIN/1.73
GLOBULIN UR ELPH-MCNC: 2.6 GM/DL
GLUCOSE SERPL-MCNC: 95 MG/DL (ref 65–99)
HDLC SERPL-MCNC: 35 MG/DL (ref 40–60)
LDLC SERPL CALC-MCNC: 138 MG/DL (ref 0–100)
LDLC/HDLC SERPL: 3.89 {RATIO}
POTASSIUM SERPL-SCNC: 4.3 MMOL/L (ref 3.5–5.2)
PROT SERPL-MCNC: 6.9 G/DL (ref 6–8.5)
SODIUM SERPL-SCNC: 139 MMOL/L (ref 136–145)
TRIGL SERPL-MCNC: 104 MG/DL (ref 0–150)
VLDLC SERPL-MCNC: 19 MG/DL (ref 5–40)

## 2021-05-21 RX ORDER — MELOXICAM 15 MG/1
15 TABLET ORAL DAILY
Qty: 30 TABLET | Refills: 3 | Status: SHIPPED | OUTPATIENT
Start: 2021-05-21 | End: 2021-10-18

## 2021-06-14 ENCOUNTER — TELEPHONE (OUTPATIENT)
Dept: PAIN MEDICINE | Facility: CLINIC | Age: 46
End: 2021-06-14

## 2021-06-14 ENCOUNTER — OFFICE VISIT (OUTPATIENT)
Dept: PAIN MEDICINE | Facility: CLINIC | Age: 46
End: 2021-06-14

## 2021-06-14 VITALS
DIASTOLIC BLOOD PRESSURE: 83 MMHG | RESPIRATION RATE: 16 BRPM | SYSTOLIC BLOOD PRESSURE: 117 MMHG | OXYGEN SATURATION: 98 % | WEIGHT: 160 LBS | BODY MASS INDEX: 26.66 KG/M2 | HEART RATE: 87 BPM | HEIGHT: 65 IN

## 2021-06-14 DIAGNOSIS — M54.16 LUMBAR RADICULITIS: ICD-10-CM

## 2021-06-14 DIAGNOSIS — M47.812 CERVICAL SPONDYLOSIS WITHOUT MYELOPATHY: ICD-10-CM

## 2021-06-14 DIAGNOSIS — M47.817 LUMBOSACRAL SPONDYLOSIS WITHOUT MYELOPATHY: ICD-10-CM

## 2021-06-14 DIAGNOSIS — G89.4 CHRONIC PAIN SYNDROME: Primary | ICD-10-CM

## 2021-06-14 PROCEDURE — 99204 OFFICE O/P NEW MOD 45 MIN: CPT | Performed by: ANESTHESIOLOGY

## 2021-06-14 RX ORDER — ERENUMAB-AOOE 140 MG/ML
INJECTION, SOLUTION SUBCUTANEOUS
COMMUNITY
Start: 2021-06-07 | End: 2022-07-12

## 2021-06-14 RX ORDER — CHLORHEXIDINE GLUCONATE 0.12 MG/ML
RINSE ORAL
COMMUNITY
Start: 2021-05-27 | End: 2021-11-29

## 2021-06-14 NOTE — PROGRESS NOTES
Subjective    CC back pain, right hip pain  Taylor Cat is a 45 y.o. female with chronic neck pain, back pain history of T12 fracture 2016, here for initial evaluation.  Referred by PCP.  Complains of worsening back pain mostly axial occasionally radiating to bilateral hips and right lower extremity worse with standing walking or activity.  Denies injury, saddle anesthesia bladder bowel continence.  Chronic neck pain mostly myofascial mild relief with steroid pack and muscle relaxer.  Denies regular pain.    Back pain interfere with work despite accommodations/works at [employer].   Tried LESI in the past with good relief but was short-lived.  Tried muscle relaxers, anti-inflammatory, physical therapy with marginal relief.    L-spine x-ray 2017:  Stable remote fracture deformity with anterior wall wedge deformity of T12.  Otherwise mild degenerative changes.    Pain Assessment   Location of Pain: Lower Back, R Hip, L Hip, R Leg, neck pain, joint  Description of Pain: Dull/Aching, Throbbing, Stabbing  Previous Pain Rating :4  Current Pain Ratin  Aggravating Factors: Activity  Alleviating Factors: Rest, Medication    PEG Assessment   What number best describes your pain on average in the past week?4  What number best describes how, during the past week, pain has interfered with your enjoyment of life?4  What number best describes how, during the past week, pain has interfered with your general activity?  10    The following portions of the patient's history were reviewed and updated as appropriate: allergies, current medications, past family history, past medical history, past social history, past surgical history and problem list.     has a past medical history of Anemia, Anemia, Arthritis, Asthma, Chronic back pain, Chronic pain, DDD (degenerative disc disease), lumbar, DDD (degenerative disc disease), lumbar, Depression, Endometriosis, Hemorrhoids, IBS (irritable bowel syndrome), Insomnia, Low back pain,  "Lumbar radiculitis, MAMMO, Migraine, Myofasciitis, PAP, Spondylosis, and T12 Vert Fx.   has a past surgical history that includes Hysterectomy; d & c and laparoscopy; Cyst Removal; and Subtotal Hysterectomy.  family history includes Alcohol abuse in her father; Arthritis in her mother; Cancer in her mother; Heart disease in her father, mother, and paternal grandfather; Irritable bowel syndrome in her brother; Stroke in her father; Thyroid disease in her maternal grandfather and mother.  Social History     Tobacco Use   • Smoking status: Former Smoker     Packs/day: 0.25     Years: 4.00     Pack years: 1.00     Types: Cigarettes     Quit date:      Years since quittin.4   • Smokeless tobacco: Never Used   Substance Use Topics   • Alcohol use: No     Review of Systems   Musculoskeletal: Positive for arthralgias, back pain, myalgias and neck pain.   Neurological: Positive for headache.   All other systems reviewed and are negative.    Objective   Physical Exam  Vitals reviewed.   Constitutional:       General: She is not in acute distress.  Pulmonary:      Effort: Pulmonary effort is normal.   Musculoskeletal:      Cervical back: Spasms present. Decreased range of motion.      Lumbar back: Spasms and tenderness present. Positive right straight leg raise test.      Comments: Lumbar loading positive, pain on extension of low back past 5 degrees.  TTP on the lumbar facets noted.   Psychiatric:         Behavior: Behavior normal.       /83   Pulse 87   Resp 16   Ht 165.1 cm (65\")   Wt 72.6 kg (160 lb)   SpO2 98%   BMI 26.63 kg/m²     PHQ 9 on chart  Opioid risk tool low risk    Assessment/Plan   Diagnoses and all orders for this visit:    1. Chronic pain syndrome (Primary)    2. Cervical spondylosis without myelopathy    3. Lumbosacral spondylosis without myelopathy    4. Lumbar radiculitis  -     MRI Lumbar Spine Without Contrast; Future    Summary  Taylor Cat is a 45 y.o. female with chronic " neck pain, back pain history of T12 fracture 2016, here for initial evaluation.  Referred by PCP.  Chronic pain from lumbar DDD spondylosis with occasional right lower extremity radicular pain.  Chronic myofascial neck pain.     Worsening axial back pain with occasional right lower extremity radicular pain.  Back pain interfere with work despite accommodations/works at [employer].   Tried LESI in the past with good relief but was short-lived.  Tried muscle relaxers, anti-inflammatory, physical therapy with marginal relief.    L-spine MRI ordered for evaluation.  Consider LESI or facet block after review.    Continue Robaxin meloxicam as prescribed.    We will obtain C-spine x-ray report from [facility].    RTC 1 month

## 2021-06-22 ENCOUNTER — TELEPHONE (OUTPATIENT)
Dept: PAIN MEDICINE | Facility: CLINIC | Age: 46
End: 2021-06-22

## 2021-06-22 NOTE — TELEPHONE ENCOUNTER
Caller: CECIL CLEMENS    Relationship: AYAD SIMON    Best call back number: [phone]    What orders are you requesting (i.e. lab or imaging): MRI ORDER / LUMBAR SPINE    In what timeframe would the patient need to come in: PT IS SCHEDULED FOR MRI ON 6-24-21    Where will you receive your lab/imaging services: [email or web address]    Additional notes: CECIL BRISENO/[employer] CALLED AND STATED THAT PT HAS APPT WITH [facility] FOR LUMBAR SPINE MRI & HAS REQUESTED ORDER TO BE SENT/FAXED TO [email or web address] AT FAX # :  [phone]

## 2021-06-29 ENCOUNTER — OFFICE VISIT (OUTPATIENT)
Dept: PAIN MEDICINE | Facility: CLINIC | Age: 46
End: 2021-06-29

## 2021-06-29 VITALS
HEART RATE: 83 BPM | RESPIRATION RATE: 16 BRPM | OXYGEN SATURATION: 97 % | BODY MASS INDEX: 26.66 KG/M2 | HEIGHT: 65 IN | DIASTOLIC BLOOD PRESSURE: 75 MMHG | WEIGHT: 160 LBS | SYSTOLIC BLOOD PRESSURE: 106 MMHG

## 2021-06-29 DIAGNOSIS — M79.18 MYOFASCIAL PAIN SYNDROME: ICD-10-CM

## 2021-06-29 DIAGNOSIS — M47.812 CERVICAL SPONDYLOSIS WITHOUT MYELOPATHY: ICD-10-CM

## 2021-06-29 DIAGNOSIS — M47.817 LUMBOSACRAL SPONDYLOSIS WITHOUT MYELOPATHY: ICD-10-CM

## 2021-06-29 DIAGNOSIS — G89.4 CHRONIC PAIN SYNDROME: Primary | ICD-10-CM

## 2021-06-29 PROCEDURE — 99214 OFFICE O/P EST MOD 30 MIN: CPT | Performed by: ANESTHESIOLOGY

## 2021-06-29 NOTE — PROGRESS NOTES
Subjective    CC back pain, right hip pain  Taylor Cat is a 45 y.o. female with chronic neck pain, back pain history of T12 fracture 2016, here for follow-up.  L-spine MRI completed showing degenerative changes/facet arthropathy.  Continued worsening back pain mostly axial occasionally radiating to bilateral hips and right lower extremity worse with standing walking or activity.  Denies injury, saddle anesthesia bladder bowel continence.  Chronic neck pain mostly myofascial mild relief with steroid pack and muscle relaxer.  Denies regular pain.    Back pain interfere with work despite accommodations/works at [employer].   Tried LESI in the past with good relief but was short-lived.  Tried muscle relaxers, anti-inflammatory, physical therapy with marginal relief.    Spine MRI 2021 facet degeneration primarily at L4-L5 of mild severity.  Negative for disc extrusion compression or fracture or subluxation.  L-spine x-ray 2017:  Stable remote fracture deformity with anterior wall wedge deformity of T12.  Otherwise mild degenerative changes.    Pain Assessment   Location of Pain: Lower Back, R Hip, L Hip, R Leg, neck pain, joint  Description of Pain: Dull/Aching, Throbbing, Stabbing  Previous Pain Rating :4  Current Pain Ratin  Aggravating Factors: Activity  Alleviating Factors: Rest, Medication    PEG Assessment   What number best describes your pain on average in the past week?4  What number best describes how, during the past week, pain has interfered with your enjoyment of life?4  What number best describes how, during the past week, pain has interfered with your general activity?  10    The following portions of the patient's history were reviewed and updated as appropriate: allergies, current medications, past family history, past medical history, past social history, past surgical history and problem list.     has a past medical history of Anemia, Anemia, Arthritis, Asthma, Chronic back pain, Chronic pain,  "DDD (degenerative disc disease), lumbar, DDD (degenerative disc disease), lumbar, Depression, Endometriosis, Hemorrhoids, IBS (irritable bowel syndrome), Insomnia, Low back pain, Lumbar radiculitis, MAMMO, Migraine, Myofasciitis, PAP, Spondylosis, and T12 Vert Fx.   has a past surgical history that includes Hysterectomy; d & c and laparoscopy; Cyst Removal; and Subtotal Hysterectomy.  family history includes Alcohol abuse in her father; Arthritis in her mother; Cancer in her mother; Heart disease in her father, mother, and paternal grandfather; Irritable bowel syndrome in her brother; Stroke in her father; Thyroid disease in her maternal grandfather and mother.  Social History     Tobacco Use   • Smoking status: Former Smoker     Packs/day: 0.25     Years: 4.00     Pack years: 1.00     Types: Cigarettes     Quit date:      Years since quittin.4   • Smokeless tobacco: Never Used   Substance Use Topics   • Alcohol use: No     Review of Systems   Musculoskeletal: Positive for arthralgias, back pain, myalgias and neck pain.   Neurological: Positive for headache.   All other systems reviewed and are negative.    Objective   Physical Exam  Vitals reviewed.   Constitutional:       General: She is not in acute distress.  Pulmonary:      Effort: Pulmonary effort is normal.   Musculoskeletal:      Cervical back: Spasms present. Decreased range of motion.      Lumbar back: Spasms and tenderness present. Positive right straight leg raise test.      Comments: Lumbar loading positive, pain on extension of low back past 5 degrees.  TTP on the lumbar facets noted.   Psychiatric:         Behavior: Behavior normal.       /75   Pulse 83   Resp 16   Ht 165.1 cm (65\")   Wt 72.6 kg (160 lb)   SpO2 97%   BMI 26.63 kg/m²     PHQ 9 on chart  Opioid risk tool low risk    Assessment/Plan   Diagnoses and all orders for this visit:    1. Chronic pain syndrome (Primary)    2. Lumbosacral spondylosis without myelopathy  -     " Facet    3. Myofascial pain syndrome    4. Cervical spondylosis without myelopathy    Summary  Taylor Cat is a 45 y.o. female with chronic neck pain, back pain history of T12 fracture 2016, here for follow-up.  Chronic pain from lumbar DDD spondylosis with occasional right lower extremity radicular pain.  Chronic myofascial neck pain.     Continued worsening axial back pain with occasional right lower extremity radicular pain.  Back pain interfere with work despite accommodations/works at [employer].   Tried LESI in the past with good relief but was short-lived.  Tried muscle relaxers, anti-inflammatory, physical therapy with marginal relief.    L-spine MRI reviewed showing degenerative changes mostly facet arthropathy at worst at L4-L5.    We will schedule for bilateral lumbar medial branch block at L3/4, L4/5, L5/S1.  If effective plan RFA.  Risk and benefits of procedure discussed.    Continue Robaxin meloxicam as prescribed.    RTC for procedure

## 2021-08-06 ENCOUNTER — HOSPITAL ENCOUNTER (OUTPATIENT)
Dept: PAIN MEDICINE | Facility: HOSPITAL | Age: 46
Discharge: HOME | End: 2021-08-06

## 2021-08-06 ENCOUNTER — TELEPHONE (OUTPATIENT)
Dept: FAMILY MEDICINE CLINIC | Facility: CLINIC | Age: 46
End: 2021-08-06

## 2021-08-06 VITALS
TEMPERATURE: 97.1 F | OXYGEN SATURATION: 96 % | SYSTOLIC BLOOD PRESSURE: 116 MMHG | HEIGHT: 66 IN | BODY MASS INDEX: 25.39 KG/M2 | DIASTOLIC BLOOD PRESSURE: 77 MMHG | HEART RATE: 63 BPM | RESPIRATION RATE: 16 BRPM | WEIGHT: 158 LBS

## 2021-08-06 DIAGNOSIS — R52 PAIN: ICD-10-CM

## 2021-08-06 DIAGNOSIS — M47.817 LUMBOSACRAL SPONDYLOSIS WITHOUT MYELOPATHY: Primary | ICD-10-CM

## 2021-08-06 PROCEDURE — 64494 INJ PARAVERT F JNT L/S 2 LEV: CPT | Performed by: ANESTHESIOLOGY

## 2021-08-06 PROCEDURE — 64495 INJ PARAVERT F JNT L/S 3 LEV: CPT | Performed by: ANESTHESIOLOGY

## 2021-08-06 PROCEDURE — 25010000002 METHYLPREDNISOLONE PER 40 MG: Performed by: ANESTHESIOLOGY

## 2021-08-06 PROCEDURE — 0 IOPAMIDOL 41 % SOLUTION: Performed by: ANESTHESIOLOGY

## 2021-08-06 PROCEDURE — 64493 INJ PARAVERT F JNT L/S 1 LEV: CPT | Performed by: ANESTHESIOLOGY

## 2021-08-06 PROCEDURE — 77003 FLUOROGUIDE FOR SPINE INJECT: CPT

## 2021-08-06 RX ORDER — METHYLPREDNISOLONE ACETATE 40 MG/ML
40 INJECTION, SUSPENSION INTRA-ARTICULAR; INTRALESIONAL; INTRAMUSCULAR; SOFT TISSUE ONCE
Status: COMPLETED | OUTPATIENT
Start: 2021-08-06 | End: 2021-08-06

## 2021-08-06 RX ORDER — BUPIVACAINE HYDROCHLORIDE 2.5 MG/ML
10 INJECTION, SOLUTION EPIDURAL; INFILTRATION; INTRACAUDAL ONCE
Status: COMPLETED | OUTPATIENT
Start: 2021-08-06 | End: 2021-08-06

## 2021-08-06 RX ORDER — LIDOCAINE HYDROCHLORIDE 10 MG/ML
5 INJECTION, SOLUTION EPIDURAL; INFILTRATION; INTRACAUDAL; PERINEURAL ONCE
Status: COMPLETED | OUTPATIENT
Start: 2021-08-06 | End: 2021-08-06

## 2021-08-06 RX ADMIN — LIDOCAINE HYDROCHLORIDE 5 ML: 10 INJECTION, SOLUTION EPIDURAL; INFILTRATION; INTRACAUDAL; PERINEURAL at 10:26

## 2021-08-06 RX ADMIN — BUPIVACAINE HYDROCHLORIDE 10 ML: 2.5 INJECTION, SOLUTION EPIDURAL; INFILTRATION; INTRACAUDAL; PERINEURAL at 10:30

## 2021-08-06 RX ADMIN — IOPAMIDOL 3 ML: 408 INJECTION, SOLUTION INTRATHECAL at 10:30

## 2021-08-06 RX ADMIN — METHYLPREDNISOLONE ACETATE 40 MG: 40 INJECTION, SUSPENSION INTRA-ARTICULAR; INTRALESIONAL; INTRAMUSCULAR; INTRASYNOVIAL; SOFT TISSUE at 10:30

## 2021-08-06 NOTE — PROCEDURES
"Subjective    Cc back pain  Taylor Cat is a 45 y.o. female with lumbar spondylosis here for bilateral lumbar branch block.  No anticoagulation    Pain Assessment   Location of Pain: Lower Back, R Hip, L Hip,   Description of Pain: Dull/Aching, Throbbing, Stabbing  Previous Pain Rating :7  Current Pain Ratin  Aggravating Factors: Activity  Alleviating Factors: Rest, Medication    The following portions of the patient's history were reviewed and updated as appropriate: allergies, current medications, past family history, past medical history, past social history, past surgical history and problem list.      Review of Systems  As in HPI  Objective   Physical Exam  Constitutional:       General: She is not in acute distress.     Appearance: She is well-developed.   Cardiovascular:      Rate and Rhythm: Normal rate.   Pulmonary:      Effort: Pulmonary effort is normal.   Musculoskeletal:      Lumbar back: Tenderness present. Decreased range of motion.   Neurological:      Mental Status: She is alert and oriented to person, place, and time.       /77 (BP Location: Left arm, Patient Position: Sitting)   Pulse 63   Temp 97.1 °F (36.2 °C) (Skin)   Resp 16   Ht 167.6 cm (66\")   Wt 71.7 kg (158 lb)   SpO2 96%   BMI 25.50 kg/m²     Assessment/Plan    underwent bilateral lumbar branch block    RTC 4-6 weeks or as needed for repeat    DATE OF PROCEDURE: 2021    PREOPERATIVE DIAGNOSIS: Lumbar spondylosis without myelopathy    POSTOPERATIVE DIAGNOSIS: same    PROCEDURE PERFORMED: Zach Lumbar Medial Branch Block at L3/L4, L4/L5, L5/S1.     The patient presents with a history of lumbosacral spondylosis bilaterally at level [ L3/L4] [ L4/L5 ] [ L5/S1].   The patient understands the risks and benefits of the procedure and wishes to proceed. The patient was seen in the preoperative area.  Patient's consent was obtained and updated.  Vitals were taken.  Patient was then brought to the procedure suite and placed " in a prone position. The appropriate anatomic area was widely prepped with Chloroprep and draped in a sterile fashion.  Noninvasive monitoring per routine anesthesia protocol was placed.  Under fluoroscopic guidance an AP view with caudad cephaled tilt was obtained. A 22 gauge curved tip spinal needle was passed through skin anesthetized with 1% Lidocaine without epinephrine. The needle tip was guided into the superior medial aspect of the transverse process at  [ L3 ] [ L4 ] [ L5 ] [ sacral ala ].  Next 0.25 mL of  preservative free contrast were injected.   At this point 0.5 mL of a solution  containing 1 mL of 40 mg Depo-Medrol and 4 mL of 0.25% bupivacaine was injected. A sterile dressing was placed over the puncture site. The patient tolerated the procedure with  no complications. They were then brought to the post procedure area where they recovered nicely.

## 2021-08-09 RX ORDER — METHOCARBAMOL 500 MG/1
TABLET, FILM COATED ORAL
Qty: 30 TABLET | Refills: 1 | Status: SHIPPED | OUTPATIENT
Start: 2021-08-09 | End: 2022-01-20

## 2021-08-09 RX ORDER — ONDANSETRON 4 MG/1
4 TABLET, ORALLY DISINTEGRATING ORAL EVERY 8 HOURS PRN
Qty: 30 TABLET | Refills: 0 | Status: SHIPPED | OUTPATIENT
Start: 2021-08-09 | End: 2021-11-29 | Stop reason: SDUPTHER

## 2021-08-09 NOTE — TELEPHONE ENCOUNTER
Rx Refill Note  Requested Prescriptions     Pending Prescriptions Disp Refills   • ondansetron ODT (ZOFRAN-ODT) 4 MG disintegrating tablet [Pharmacy Med Name: ONDANSETRON ODT 4 MG TABLET] 30 tablet 0     Sig: PLACE 1 TABLET ON THE TONGUE EVERY 8 (EIGHT) HOURS AS NEEDED FOR NAUSEA OR VOMITING.   • methocarbamol (ROBAXIN) 500 MG tablet [Pharmacy Med Name: METHOCARBAMOL 500 MG TABLET] 30 tablet 1     Sig: TAKE 1 TO 2 TABLETS BY MOUTH AT BEDTIME AS NEEDED LOW BACK PAIN      Last office visit with prescribing clinician: 5/20/2021      Next office visit with prescribing clinician: Visit date not found     } Lipid Panel (05/20/2021 13:25)  Comprehensive Metabolic Panel (05/20/2021 13:25)  {TIP  Please add Last Relevant Lab Date if appropriate:23      Darlyn Jeffries CMA  08/09/21, 08:24 EDT

## 2021-09-03 ENCOUNTER — HOSPITAL ENCOUNTER (OUTPATIENT)
Dept: PAIN MEDICINE | Facility: HOSPITAL | Age: 46
Discharge: HOME | End: 2021-09-03

## 2021-09-03 VITALS
DIASTOLIC BLOOD PRESSURE: 75 MMHG | RESPIRATION RATE: 16 BRPM | OXYGEN SATURATION: 97 % | HEIGHT: 66 IN | SYSTOLIC BLOOD PRESSURE: 121 MMHG | BODY MASS INDEX: 25.39 KG/M2 | HEART RATE: 67 BPM | TEMPERATURE: 97.8 F | WEIGHT: 158 LBS

## 2021-09-03 DIAGNOSIS — R52 PAIN: ICD-10-CM

## 2021-09-03 DIAGNOSIS — M47.817 LUMBOSACRAL SPONDYLOSIS WITHOUT MYELOPATHY: Primary | ICD-10-CM

## 2021-09-03 PROCEDURE — 64494 INJ PARAVERT F JNT L/S 2 LEV: CPT | Performed by: ANESTHESIOLOGY

## 2021-09-03 PROCEDURE — 25010000002 METHYLPREDNISOLONE PER 40 MG: Performed by: ANESTHESIOLOGY

## 2021-09-03 PROCEDURE — 64495 INJ PARAVERT F JNT L/S 3 LEV: CPT | Performed by: ANESTHESIOLOGY

## 2021-09-03 PROCEDURE — 64493 INJ PARAVERT F JNT L/S 1 LEV: CPT | Performed by: ANESTHESIOLOGY

## 2021-09-03 PROCEDURE — 0 IOPAMIDOL 41 % SOLUTION: Performed by: ANESTHESIOLOGY

## 2021-09-03 PROCEDURE — 77003 FLUOROGUIDE FOR SPINE INJECT: CPT

## 2021-09-03 RX ORDER — METHYLPREDNISOLONE ACETATE 40 MG/ML
40 INJECTION, SUSPENSION INTRA-ARTICULAR; INTRALESIONAL; INTRAMUSCULAR; SOFT TISSUE ONCE
Status: COMPLETED | OUTPATIENT
Start: 2021-09-03 | End: 2021-09-03

## 2021-09-03 RX ORDER — BUPIVACAINE HYDROCHLORIDE 2.5 MG/ML
10 INJECTION, SOLUTION EPIDURAL; INFILTRATION; INTRACAUDAL ONCE
Status: COMPLETED | OUTPATIENT
Start: 2021-09-03 | End: 2021-09-03

## 2021-09-03 RX ORDER — LIDOCAINE HYDROCHLORIDE 10 MG/ML
5 INJECTION, SOLUTION EPIDURAL; INFILTRATION; INTRACAUDAL; PERINEURAL ONCE
Status: COMPLETED | OUTPATIENT
Start: 2021-09-03 | End: 2021-09-03

## 2021-09-03 RX ADMIN — IOPAMIDOL 3.5 ML: 408 INJECTION, SOLUTION INTRATHECAL at 10:18

## 2021-09-03 RX ADMIN — LIDOCAINE HYDROCHLORIDE 5 ML: 10 INJECTION, SOLUTION EPIDURAL; INFILTRATION; INTRACAUDAL; PERINEURAL at 10:12

## 2021-09-03 RX ADMIN — BUPIVACAINE HYDROCHLORIDE 10 ML: 2.5 INJECTION, SOLUTION EPIDURAL; INFILTRATION; INTRACAUDAL; PERINEURAL at 10:18

## 2021-09-03 RX ADMIN — METHYLPREDNISOLONE ACETATE 40 MG: 40 INJECTION, SUSPENSION INTRA-ARTICULAR; INTRALESIONAL; INTRAMUSCULAR; INTRASYNOVIAL; SOFT TISSUE at 10:18

## 2021-09-03 NOTE — PROCEDURES
"Subjective    Cc back pain  Taylor Cat is a 45 y.o. female with lumbar spondylosis here for repeat bilateral lumbar branch block.  No anticoagulation    Pain Assessment   Location of Pain: Lower Back, R Hip, L Hip,   Description of Pain: Dull/Aching, Throbbing, Stabbing  Previous Pain Rating :7  Current Pain Ratin  Aggravating Factors: Activity  Alleviating Factors: Rest, Medication    The following portions of the patient's history were reviewed and updated as appropriate: allergies, current medications, past family history, past medical history, past social history, past surgical history and problem list.      Review of Systems  As in HPI  Objective   Physical Exam  Constitutional:       General: She is not in acute distress.     Appearance: She is well-developed.   Cardiovascular:      Rate and Rhythm: Normal rate.   Pulmonary:      Effort: Pulmonary effort is normal.   Musculoskeletal:      Lumbar back: Tenderness present. Decreased range of motion.   Neurological:      Mental Status: She is alert and oriented to person, place, and time.       /74 (Patient Position: Sitting)   Pulse 62   Temp 97.8 °F (36.6 °C) (Skin)   Resp 16   Ht 167.6 cm (66\")   Wt 71.7 kg (158 lb)   SpO2 98%   BMI 25.50 kg/m²     Assessment/Plan    underwent repeat  bilateral lumbar branch block.\  Reports 90% relief 15 minutes after the procedure.  Also had 80% relief for 2 weeks after first bilateral lumbar medial branch block.  Schedule for lumbar RFA at L3/4, L4/5, L5/S1 with sedation.    RTC for procedure    DATE OF PROCEDURE: 9/3/2021    PREOPERATIVE DIAGNOSIS: Lumbar spondylosis without myelopathy    POSTOPERATIVE DIAGNOSIS: same    PROCEDURE PERFORMED: Zach Lumbar Medial Branch Block at L3/L4, L4/L5, L5/S1.     The patient presents with a history of lumbosacral spondylosis bilaterally at level [ L3/L4] [ L4/L5 ] [ L5/S1].   The patient understands the risks and benefits of the procedure and wishes to proceed. " The patient was seen in the preoperative area.  Patient's consent was obtained and updated.  Vitals were taken.  Patient was then brought to the procedure suite and placed in a prone position. The appropriate anatomic area was widely prepped with Chloroprep and draped in a sterile fashion.  Noninvasive monitoring per routine anesthesia protocol was placed.  Under fluoroscopic guidance an AP view with caudad cephaled tilt was obtained. A 22 gauge curved tip spinal needle was passed through skin anesthetized with 1% Lidocaine without epinephrine. The needle tip was guided into the superior medial aspect of the transverse process at  [ L3 ] [ L4 ] [ L5 ] [ sacral ala ].  Next 0.25 mL of  preservative free contrast were injected.   At this point 0.5 mL of a solution  containing 1 mL of 40 mg Depo-Medrol and 4 mL of 0.25% bupivacaine was injected. A sterile dressing was placed over the puncture site. The patient tolerated the procedure with  no complications. They were then brought to the post procedure area where they recovered nicely.

## 2021-09-08 ENCOUNTER — TELEPHONE (OUTPATIENT)
Dept: PAIN MEDICINE | Facility: CLINIC | Age: 46
End: 2021-09-08

## 2021-09-08 ENCOUNTER — TELEPHONE (OUTPATIENT)
Dept: FAMILY MEDICINE CLINIC | Facility: CLINIC | Age: 46
End: 2021-09-08

## 2021-09-08 NOTE — TELEPHONE ENCOUNTER
PATIENT IS CALLING IN TO ASK IF THE [facility] PAPER WORK THAT IS ON FILE FROM LAST YEAR BE UPDATED TO COVER THIS UPCOMING YEAR.  HER EMPLOYER STATED THE SAME PAPER WORK CAN BE USED IT JUST NEEDS THE DATE CHANGED.  IF NOT SHE WILL  HAVE TO GET THE PAPERWORK  FROM HER EMPLOYER TO HAVE IT RE FILLED OUT     PLEASE ADVISE OK TO LEAVE A VOICE MAIL     [phone]

## 2021-09-13 ENCOUNTER — TELEPHONE (OUTPATIENT)
Dept: FAMILY MEDICINE CLINIC | Facility: CLINIC | Age: 46
End: 2021-09-13

## 2021-09-21 ENCOUNTER — TELEPHONE (OUTPATIENT)
Dept: PAIN MEDICINE | Facility: CLINIC | Age: 46
End: 2021-09-21

## 2021-09-21 NOTE — TELEPHONE ENCOUNTER
Provider:  DR. CANDICE PINTO  Caller: TIMOTHY HORTA  Relationship to Patient:  SELF  Pharmacy:   Phone Number:  [phone]  Reason for Call:  PATIENT SCHEDULED FOR 2 PROCEDURES ON 10/29/21 AND 11/12/21. PATIENT WILL BE GOING OUT OF TOWN AFTER 10/29 PROCEDURE. ASKING IF SHE HAS ANY RESTRICTIONS.

## 2021-09-22 NOTE — TELEPHONE ENCOUNTER
9/22/21-- left pt a voice message about what Dr DE LA CRUZ stated in her message note about procedures and being out of town afterwards

## 2021-09-22 NOTE — TELEPHONE ENCOUNTER
Standard restriction after the procedure is no strenuous activity the day of the procedure and probably 2 to 3 days after with RFA due to possible post procedure discomfort.  Being out of town is not an issue if she can follow these recommendation.  However if she has any complication out of town with could not evaluate her.

## 2021-10-18 RX ORDER — MELOXICAM 15 MG/1
TABLET ORAL
Qty: 30 TABLET | Refills: 3 | Status: SHIPPED | OUTPATIENT
Start: 2021-10-18 | End: 2022-03-07

## 2021-10-18 NOTE — TELEPHONE ENCOUNTER
Rx Refill Note  Requested Prescriptions     Pending Prescriptions Disp Refills   • meloxicam (MOBIC) 15 MG tablet [Pharmacy Med Name: MELOXICAM 15 MG TABLET] 30 tablet 3     Sig: TAKE 1 TABLET BY MOUTH EVERY DAY      Last office visit with prescribing clinician: 5/20/2021      Next office visit with prescribing clinician: Visit date not found     Lipid Panel (05/20/2021 13:25)  Comprehensive Metabolic Panel (05/20/2021 13:25)         Darlyn Jeffries CMA  10/18/21, 08:43 EDT

## 2021-11-12 ENCOUNTER — HOSPITAL ENCOUNTER (OUTPATIENT)
Dept: PAIN MEDICINE | Facility: HOSPITAL | Age: 46
Discharge: HOME | End: 2021-11-12

## 2021-11-12 VITALS
WEIGHT: 165 LBS | TEMPERATURE: 97.3 F | RESPIRATION RATE: 10 BRPM | DIASTOLIC BLOOD PRESSURE: 70 MMHG | HEART RATE: 73 BPM | BODY MASS INDEX: 26.52 KG/M2 | HEIGHT: 66 IN | SYSTOLIC BLOOD PRESSURE: 109 MMHG | OXYGEN SATURATION: 98 %

## 2021-11-12 DIAGNOSIS — M47.817 LUMBOSACRAL SPONDYLOSIS WITHOUT MYELOPATHY: Primary | ICD-10-CM

## 2021-11-12 DIAGNOSIS — R52 PAIN: ICD-10-CM

## 2021-11-12 PROCEDURE — 64635 DESTROY LUMB/SAC FACET JNT: CPT | Performed by: ANESTHESIOLOGY

## 2021-11-12 PROCEDURE — 77003 FLUOROGUIDE FOR SPINE INJECT: CPT

## 2021-11-12 PROCEDURE — 64636 DESTROY L/S FACET JNT ADDL: CPT | Performed by: ANESTHESIOLOGY

## 2021-11-12 PROCEDURE — 99152 MOD SED SAME PHYS/QHP 5/>YRS: CPT | Performed by: ANESTHESIOLOGY

## 2021-11-12 PROCEDURE — 25010000002 FENTANYL CITRATE (PF) 50 MCG/ML SOLUTION

## 2021-11-12 PROCEDURE — 25010000002 METHYLPREDNISOLONE PER 40 MG: Performed by: ANESTHESIOLOGY

## 2021-11-12 PROCEDURE — 25010000002 MIDAZOLAM PER 1 MG

## 2021-11-12 RX ORDER — IBUPROFEN 800 MG/1
800 TABLET ORAL EVERY 6 HOURS PRN
COMMUNITY

## 2021-11-12 RX ORDER — MIDAZOLAM HYDROCHLORIDE 1 MG/ML
INJECTION INTRAMUSCULAR; INTRAVENOUS
Status: COMPLETED
Start: 2021-11-12 | End: 2021-11-12

## 2021-11-12 RX ORDER — LIDOCAINE HYDROCHLORIDE 10 MG/ML
5 INJECTION, SOLUTION EPIDURAL; INFILTRATION; INTRACAUDAL; PERINEURAL ONCE
Status: COMPLETED | OUTPATIENT
Start: 2021-11-12 | End: 2021-11-12

## 2021-11-12 RX ORDER — FENTANYL CITRATE 50 UG/ML
50 INJECTION, SOLUTION INTRAMUSCULAR; INTRAVENOUS
Status: DISCONTINUED | OUTPATIENT
Start: 2021-11-12 | End: 2021-11-13 | Stop reason: HOSPADM

## 2021-11-12 RX ORDER — MIDAZOLAM HYDROCHLORIDE 1 MG/ML
2 INJECTION INTRAMUSCULAR; INTRAVENOUS ONCE
Status: COMPLETED | OUTPATIENT
Start: 2021-11-12 | End: 2021-11-12

## 2021-11-12 RX ORDER — SODIUM CHLORIDE 9 MG/ML
30 INJECTION, SOLUTION INTRAVENOUS ONCE
Status: COMPLETED | OUTPATIENT
Start: 2021-11-12 | End: 2021-11-12

## 2021-11-12 RX ORDER — FENTANYL CITRATE 50 UG/ML
INJECTION, SOLUTION INTRAMUSCULAR; INTRAVENOUS
Status: COMPLETED
Start: 2021-11-12 | End: 2021-11-12

## 2021-11-12 RX ORDER — METHYLPREDNISOLONE ACETATE 40 MG/ML
40 INJECTION, SUSPENSION INTRA-ARTICULAR; INTRALESIONAL; INTRAMUSCULAR; SOFT TISSUE ONCE
Status: COMPLETED | OUTPATIENT
Start: 2021-11-12 | End: 2021-11-12

## 2021-11-12 RX ORDER — BUPIVACAINE HYDROCHLORIDE 2.5 MG/ML
10 INJECTION, SOLUTION EPIDURAL; INFILTRATION; INTRACAUDAL ONCE
Status: COMPLETED | OUTPATIENT
Start: 2021-11-12 | End: 2021-11-12

## 2021-11-12 RX ADMIN — BUPIVACAINE HYDROCHLORIDE 10 ML: 2.5 INJECTION, SOLUTION EPIDURAL; INFILTRATION; INTRACAUDAL; PERINEURAL at 11:09

## 2021-11-12 RX ADMIN — FENTANYL CITRATE 100 MCG: 50 INJECTION, SOLUTION INTRAMUSCULAR; INTRAVENOUS at 10:57

## 2021-11-12 RX ADMIN — LIDOCAINE HYDROCHLORIDE 5 ML: 10 INJECTION, SOLUTION EPIDURAL; INFILTRATION; INTRACAUDAL; PERINEURAL at 11:06

## 2021-11-12 RX ADMIN — MIDAZOLAM HYDROCHLORIDE 2 MG: 1 INJECTION, SOLUTION INTRAMUSCULAR; INTRAVENOUS at 10:54

## 2021-11-12 RX ADMIN — SODIUM CHLORIDE 30 ML/HR: 0.9 INJECTION, SOLUTION INTRAVENOUS at 10:19

## 2021-11-12 RX ADMIN — METHYLPREDNISOLONE ACETATE 40 MG: 40 INJECTION, SUSPENSION INTRA-ARTICULAR; INTRALESIONAL; INTRAMUSCULAR; INTRASYNOVIAL; SOFT TISSUE at 11:09

## 2021-11-12 RX ADMIN — MIDAZOLAM HYDROCHLORIDE 2 MG: 1 INJECTION INTRAMUSCULAR; INTRAVENOUS at 10:54

## 2021-11-12 NOTE — DISCHARGE INSTRUCTIONS
Moderate Conscious Sedation, Adult, Care After  This sheet gives you information about how to care for yourself after your procedure. Your health care provider may also give you more specific instructions. If you have problems or questions, contact your health care provider.  What can I expect after the procedure?  After the procedure, it is common to have:  · Sleepiness for several hours.  · Impaired judgment for several hours.  · Difficulty with balance.  · Vomiting if you eat too soon.  Follow these instructions at home:  For the time period you were told by your health care provider:         · Rest.  · Do not participate in activities where you could fall or become injured.  · Do not drive or use machinery.  · Do not drink alcohol.  · Do not take sleeping pills or medicines that cause drowsiness.  · Do not make important decisions or sign legal documents.  · Do not take care of children on your own.  Eating and drinking    · Follow the diet recommended by your health care provider.  · Drink enough fluid to keep your urine pale yellow.  · If you vomit:  ? Drink water, juice, or soup when you can drink without vomiting.  ? Make sure you have little or no nausea before eating solid foods.    General instructions  · Take over-the-counter and prescription medicines only as told by your health care provider.  · Have a responsible adult stay with you for the time you are told. It is important to have someone help care for you until you are awake and alert.  · Do not smoke.  · Keep all follow-up visits as told by your health care provider. This is important.  Contact a health care provider if:  · You are still sleepy or having trouble with balance after 24 hours.  · You feel light-headed.  · You keep feeling nauseous or you keep vomiting.  · You develop a rash.  · You have a fever.  · You have redness or swelling around the IV site.  Get help right away if:  · You have trouble breathing.  · You have new-onset confusion  at home.  Summary  · After the procedure, it is common to feel sleepy, have impaired judgment, or feel nauseous if you eat too soon.  · Rest after you get home. Know the things you should not do after the procedure.  · Follow the diet recommended by your health care provider and drink enough fluid to keep your urine pale yellow.  · Get help right away if you have trouble breathing or new-onset confusion at home.  This information is not intended to replace advice given to you by your health care provider. Make sure you discuss any questions you have with your health care provider.  Document Revised: 04/16/2021 Document Reviewed: 11/12/2020  [employer] Patient Education © 2021 [employer]  Radiofrequency Lesioning, Care After  This sheet gives you information about how to care for yourself after your procedure. Your health care provider may also give you more specific instructions. If you have problems or questions, contact your health care provider.  What can I expect after the procedure?  After the procedure, it is common to have:  · Slight pain in the area where the procedure was done.  · Temporary numbness.  Follow these instructions at home:    Medicines  · Take over-the-counter and prescription medicines only as told by your health care provider.  · Do not drive for 24 hours if you were given a sedative during your procedure.  · Do not drive or use heavy machinery while taking prescription pain medicine.  Insertion site care    · Remove the bandage (dressing) after 24 hours or as directed by your health care provider.  · Check your needle insertion site every day for signs of infection. Watch for:  ? Redness, swelling, or pain.  ? Fluid or blood.  ? Warmth.  ? Pus or a bad smell.    Managing pain    · If directed, put ice on the painful area.  ? Put ice in a plastic bag.  ? Place a towel between your skin and the bag.  ? Leave the ice on for 20 minutes, 2-3 times a day.    General instructions  · Return to your  normal activities as told by your health care provider. Ask your health care provider what activities are safe for you.  · Pay close attention to how you feel after the procedure. If you start to have pain, write down when it hurts and how it feels. This will help you and your health care provider know if you need an additional treatment.  · Keep all follow-up visits as told by your health care provider. This is important.  Contact a health care provider if you have:  · Pain that does not get better.  · Redness, warmth, swelling, or pain at the needle insertion site.  · Fluid, blood, or pus coming from the needle insertion site.  · A fever.  Get help right away if you develop:  · Sudden, severe pain.  · Numbness or tingling near the insertion site and those symptoms do not go away.  Summary  · After the procedure, it is common to have slight pain and temporary numbness in the area where the procedure was done.  · Do not drive for 24 hours if you were given a sedative during your procedure.  · Pay close attention to how you feel after the procedure. If you start to have pain, write down when it hurts and how it feels. This will help you and your health care provider know if you need an additional treatment.  · Contact a health care provider if you have a fever or pain that does not get better, or if you notice redness, warmth, swelling, pain, fluid, blood, or pus at the insertion site.  · Get help right away if you develop sudden, severe pain, or numbness or tingling near the insertion site.  This information is not intended to replace advice given to you by your health care provider. Make sure you discuss any questions you have with your health care provider.  Document Revised: 09/05/2019 Document Reviewed: 09/05/2019  [employer]vier Patient Education © 2021 [employer]

## 2021-11-12 NOTE — PROCEDURES
"Subjective    Cc back pain  Taylor Cat is a 46 y.o. female with lumbar spondylosis here for right lumbar RFA.  No anticoagulation    Pain Assessment   Location of Pain: Lower Back, R Hip, L Hip,   Description of Pain: Dull/Aching, Throbbing, Stabbing  Previous Pain Rating :7  Current Pain Ratin  Aggravating Factors: Activity  Alleviating Factors: Rest, Medication    The following portions of the patient's history were reviewed and updated as appropriate: allergies, current medications, past family history, past medical history, past social history, past surgical history and problem list.      Review of Systems  As in HPI  Objective   Physical Exam  Constitutional:       General: She is not in acute distress.     Appearance: She is well-developed.   Cardiovascular:      Rate and Rhythm: Normal rate.   Pulmonary:      Effort: Pulmonary effort is normal.   Musculoskeletal:      Lumbar back: Tenderness present. Decreased range of motion.   Neurological:      Mental Status: She is alert and oriented to person, place, and time.       /75 (BP Location: Left arm, Patient Position: Sitting)   Pulse 74   Temp 97.3 °F (36.3 °C) (Skin)   Resp 16   Ht 167.6 cm (66\")   Wt 74.8 kg (165 lb)   SpO2 97%   BMI 26.63 kg/m²     Assessment/Plan    underwent right lumbar RFA without IV sedation.  100 mcg of fentanyl and 2 mg of Versed given. Sedation nurse Airam BENSON for procedure    DATE OF PROCEDURE:    2021    PREOPERATIVE DIAGNOSIS:   Lumbar spondylosis without myelopathy    POSTOPERATIVE DIAGNOSIS: same    PROCEDURE PERFORMED:  Right  Lumbar Sacral RFTC at L3/L4, L4/L5, L5/S1.    The patient presents with a history of lumbosacral spondylosis on the right at level [  L3/L4 ][  L4/L5 ] [ L5/ S1 ].  The patient presents today for lumbosacral RFTC.  The patient understands the risks and benefits of the procedure and wishes to proceed.  The patient was seen in the preoperative area.  Patient's consent " was obtained and updated.  Vitals were taken.  Patient was then brought to the procedure suite and placed in a prone position.  The appropriate anatomic area was widely prepped with Chloraprep and draped in a sterile fashion.  Noninvasive monitoring per routine anesthesia protocol was placed.  Under fluoroscopic guidance an AP view with caudad cephaled tilt was obtained.  A 20 guage RFTC cannula was passed through skin anesthetized with 1% Lidocaine without epinephrine.  The needle tip was guided to the superior medial aspect of the transverse process at [  L3 ][  L4 ][  L5 and  sacral ala ].  Motor stimulation was undertaken at 2.0 mAmps at 2 Hertz. At no point was motor stimulation or sensory stimulation noted in a radicular fashion.  Impedence range 200's. Prior to ablation, each level was anesthetized with 2mL of 1% Lidocaine without epinephrine. The medial branch nerves were then ablated at 80* C for 90 seconds each .  Following ablation, each level was injected with 1 mL of  expiration containing 1 mL of 40 mg Depo-Medrol and 4 mL of 0.25% bupivacaine and the RFTC cannula removed.  A sterile dressing was placed over the puncture site.    The patient tolerated the procedure with no complications. They were then brought to the post procedure area where they recovered nicely.     Discharge  The patient will be discharged home in stable condition.  Patient understands to contact the Center with any post procedure questions or concerns.  Discharge instructions given by nursing staff.

## 2021-11-29 RX ORDER — ONDANSETRON 4 MG/1
4 TABLET, ORALLY DISINTEGRATING ORAL EVERY 8 HOURS PRN
Qty: 30 TABLET | Refills: 0 | Status: SHIPPED | OUTPATIENT
Start: 2021-11-29 | End: 2022-09-26

## 2021-12-10 ENCOUNTER — HOSPITAL ENCOUNTER (OUTPATIENT)
Dept: PAIN MEDICINE | Facility: HOSPITAL | Age: 46
Discharge: HOME | End: 2021-12-10

## 2021-12-10 VITALS
BODY MASS INDEX: 25.71 KG/M2 | TEMPERATURE: 97.5 F | RESPIRATION RATE: 11 BRPM | HEART RATE: 74 BPM | SYSTOLIC BLOOD PRESSURE: 120 MMHG | OXYGEN SATURATION: 97 % | WEIGHT: 160 LBS | DIASTOLIC BLOOD PRESSURE: 89 MMHG | HEIGHT: 66 IN

## 2021-12-10 DIAGNOSIS — R52 PAIN: ICD-10-CM

## 2021-12-10 DIAGNOSIS — M54.16 LUMBAR RADICULITIS: Primary | ICD-10-CM

## 2021-12-10 PROCEDURE — 25010000002 FENTANYL CITRATE (PF) 50 MCG/ML SOLUTION: Performed by: ANESTHESIOLOGY

## 2021-12-10 PROCEDURE — 64636 DESTROY L/S FACET JNT ADDL: CPT | Performed by: ANESTHESIOLOGY

## 2021-12-10 PROCEDURE — 25010000002 METHYLPREDNISOLONE PER 40 MG: Performed by: ANESTHESIOLOGY

## 2021-12-10 PROCEDURE — 99152 MOD SED SAME PHYS/QHP 5/>YRS: CPT | Performed by: ANESTHESIOLOGY

## 2021-12-10 PROCEDURE — 25010000002 MIDAZOLAM PER 1 MG: Performed by: ANESTHESIOLOGY

## 2021-12-10 PROCEDURE — 64635 DESTROY LUMB/SAC FACET JNT: CPT | Performed by: ANESTHESIOLOGY

## 2021-12-10 PROCEDURE — 77003 FLUOROGUIDE FOR SPINE INJECT: CPT

## 2021-12-10 RX ORDER — FENTANYL CITRATE 50 UG/ML
50 INJECTION, SOLUTION INTRAMUSCULAR; INTRAVENOUS
Status: COMPLETED | OUTPATIENT
Start: 2021-12-10 | End: 2021-12-10

## 2021-12-10 RX ORDER — SODIUM CHLORIDE 9 MG/ML
50 INJECTION, SOLUTION INTRAVENOUS CONTINUOUS
Status: DISCONTINUED | OUTPATIENT
Start: 2021-12-10 | End: 2021-12-11 | Stop reason: HOSPADM

## 2021-12-10 RX ORDER — LIDOCAINE HYDROCHLORIDE 10 MG/ML
5 INJECTION, SOLUTION EPIDURAL; INFILTRATION; INTRACAUDAL; PERINEURAL ONCE
Status: COMPLETED | OUTPATIENT
Start: 2021-12-10 | End: 2021-12-10

## 2021-12-10 RX ORDER — BUPIVACAINE HYDROCHLORIDE 2.5 MG/ML
10 INJECTION, SOLUTION EPIDURAL; INFILTRATION; INTRACAUDAL ONCE
Status: COMPLETED | OUTPATIENT
Start: 2021-12-10 | End: 2021-12-10

## 2021-12-10 RX ORDER — MIDAZOLAM HYDROCHLORIDE 1 MG/ML
2 INJECTION INTRAMUSCULAR; INTRAVENOUS ONCE
Status: COMPLETED | OUTPATIENT
Start: 2021-12-10 | End: 2021-12-10

## 2021-12-10 RX ORDER — METHYLPREDNISOLONE ACETATE 40 MG/ML
40 INJECTION, SUSPENSION INTRA-ARTICULAR; INTRALESIONAL; INTRAMUSCULAR; SOFT TISSUE ONCE
Status: COMPLETED | OUTPATIENT
Start: 2021-12-10 | End: 2021-12-10

## 2021-12-10 RX ADMIN — BUPIVACAINE HYDROCHLORIDE 10 ML: 2.5 INJECTION, SOLUTION EPIDURAL; INFILTRATION; INTRACAUDAL; PERINEURAL at 11:30

## 2021-12-10 RX ADMIN — LIDOCAINE HYDROCHLORIDE 5 ML: 10 INJECTION, SOLUTION EPIDURAL; INFILTRATION; INTRACAUDAL; PERINEURAL at 11:19

## 2021-12-10 RX ADMIN — MIDAZOLAM HYDROCHLORIDE 2 MG: 1 INJECTION, SOLUTION INTRAMUSCULAR; INTRAVENOUS at 11:08

## 2021-12-10 RX ADMIN — METHYLPREDNISOLONE ACETATE 40 MG: 40 INJECTION, SUSPENSION INTRA-ARTICULAR; INTRALESIONAL; INTRAMUSCULAR; INTRASYNOVIAL; SOFT TISSUE at 11:29

## 2021-12-10 RX ADMIN — FENTANYL CITRATE 50 MCG: 50 INJECTION, SOLUTION INTRAMUSCULAR; INTRAVENOUS at 11:17

## 2021-12-10 RX ADMIN — FENTANYL CITRATE 50 MCG: 50 INJECTION, SOLUTION INTRAMUSCULAR; INTRAVENOUS at 11:08

## 2021-12-10 RX ADMIN — LIDOCAINE HYDROCHLORIDE 5 ML: 10 INJECTION, SOLUTION EPIDURAL; INFILTRATION; INTRACAUDAL; PERINEURAL at 11:20

## 2021-12-10 NOTE — PROCEDURES
"Subjective    Cc back pain  Taylor Cat is a 46 y.o. female with lumbar spondylosis here for left lumbar RFA.  No anticoagulation    Pain Assessment   Location of Pain: Lower Back, R Hip, L Hip,   Description of Pain: Dull/Aching, Throbbing, Stabbing  Previous Pain Rating :7  Current Pain Ratin  Aggravating Factors: Activity  Alleviating Factors: Rest, Medication    The following portions of the patient's history were reviewed and updated as appropriate: allergies, current medications, past family history, past medical history, past social history, past surgical history and problem list.      Review of Systems  As in HPI  Objective   Physical Exam  Constitutional:       General: She is not in acute distress.     Appearance: She is well-developed.   Cardiovascular:      Rate and Rhythm: Normal rate.   Pulmonary:      Effort: Pulmonary effort is normal.   Musculoskeletal:      Lumbar back: Tenderness present. Decreased range of motion.   Neurological:      Mental Status: She is alert and oriented to person, place, and time.       /83 (BP Location: Right arm, Patient Position: Sitting)   Pulse 71   Temp 97.5 °F (36.4 °C) (Skin)   Resp 16   Ht 167.6 cm (66\")   Wt 72.6 kg (160 lb)   SpO2 97%   BMI 25.82 kg/m²     Assessment/Plan    underwent left lumbar RFA without IV sedation.  100 mcg of fentanyl and 2 mg of Versed given. Sedation nurse Sade HAMPTON     RTC for procedure    DATE OF PROCEDURE:    2021    PREOPERATIVE DIAGNOSIS:   Lumbar spondylosis without myelopathy    POSTOPERATIVE DIAGNOSIS: same    PROCEDURE PERFORMED:  Left  Lumbar Sacral RFTC at L3/L4, L4/L5, L5/S1.    The patient presents with a history of lumbosacral spondylosis on the left at level [  L3/L4 ][  L4/L5 ] [ L5/ S1 ].  The patient presents today for lumbosacral RFTC.  The patient understands the risks and benefits of the procedure and wishes to proceed.  The patient was seen in the preoperative area.  Patient's consent was " obtained and updated.  Vitals were taken.  Patient was then brought to the procedure suite and placed in a prone position.  The appropriate anatomic area was widely prepped with Chloraprep and draped in a sterile fashion.  Noninvasive monitoring per routine anesthesia protocol was placed.  Under fluoroscopic guidance an AP view with caudad cephaled tilt was obtained.  A 20 guage RFTC cannula was passed through skin anesthetized with 1% Lidocaine without epinephrine.  The needle tip was guided to the superior medial aspect of the transverse process at [  L3 ][  L4 ][  L5 and  sacral ala ].  Motor stimulation was undertaken at 2.0 mAmps at 2 Hertz. At no point was motor stimulation or sensory stimulation noted in a radicular fashion.  Impedence range 200's. Prior to ablation, each level was anesthetized with 2mL of 1% Lidocaine without epinephrine. The medial branch nerves were then ablated at 80* C for 90 seconds each .  Following ablation, each level was injected with 1 mL of  expiration containing 1 mL of 40 mg Depo-Medrol and 4 mL of 0.25% bupivacaine and the RFTC cannula removed.  A sterile dressing was placed over the puncture site.    The patient tolerated the procedure with no complications. They were then brought to the post procedure area where they recovered nicely.     Discharge  The patient will be discharged home in stable condition.  Patient understands to contact the Center with any post procedure questions or concerns.  Discharge instructions given by nursing staff.

## 2021-12-10 NOTE — DISCHARGE INSTRUCTIONS
Moderate Conscious Sedation, Adult, Care After  This sheet gives you information about how to care for yourself after your procedure. Your health care provider may also give you more specific instructions. If you have problems or questions, contact your health care provider.  What can I expect after the procedure?  After the procedure, it is common to have:  · Sleepiness for several hours.  · Impaired judgment for several hours.  · Difficulty with balance.  · Vomiting if you eat too soon.  Follow these instructions at home:  For the time period you were told by your health care provider:         · Rest.  · Do not participate in activities where you could fall or become injured.  · Do not drive or use machinery.  · Do not drink alcohol.  · Do not take sleeping pills or medicines that cause drowsiness.  · Do not make important decisions or sign legal documents.  · Do not take care of children on your own.  Eating and drinking    · Follow the diet recommended by your health care provider.  · Drink enough fluid to keep your urine pale yellow.  · If you vomit:  ? Drink water, juice, or soup when you can drink without vomiting.  ? Make sure you have little or no nausea before eating solid foods.    General instructions  · Take over-the-counter and prescription medicines only as told by your health care provider.  · Have a responsible adult stay with you for the time you are told. It is important to have someone help care for you until you are awake and alert.  · Do not smoke.  · Keep all follow-up visits as told by your health care provider. This is important.  Contact a health care provider if:  · You are still sleepy or having trouble with balance after 24 hours.  · You feel light-headed.  · You keep feeling nauseous or you keep vomiting.  · You develop a rash.  · You have a fever.  · You have redness or swelling around the IV site.  Get help right away if:  · You have trouble breathing.  · You have new-onset confusion  at home.  Summary  · After the procedure, it is common to feel sleepy, have impaired judgment, or feel nauseous if you eat too soon.  · Rest after you get home. Know the things you should not do after the procedure.  · Follow the diet recommended by your health care provider and drink enough fluid to keep your urine pale yellow.  · Get help right away if you have trouble breathing or new-onset confusion at home.  This information is not intended to replace advice given to you by your health care provider. Make sure you discuss any questions you have with your health care provider.  Document Revised: 04/16/2021 Document Reviewed: 11/12/2020  [employer] Patient Education © 2021 [employer]  Radiofrequency Lesioning, Care After  This sheet gives you information about how to care for yourself after your procedure. Your health care provider may also give you more specific instructions. If you have problems or questions, contact your health care provider.  What can I expect after the procedure?  After the procedure, it is common to have:  · Slight pain in the area where the procedure was done.  · Temporary numbness.  Follow these instructions at home:    Medicines  · Take over-the-counter and prescription medicines only as told by your health care provider.  · Do not drive for 24 hours if you were given a sedative during your procedure.  · Do not drive or use heavy machinery while taking prescription pain medicine.  Insertion site care    · Remove the bandage (dressing) after 24 hours or as directed by your health care provider.  · Check your needle insertion site every day for signs of infection. Watch for:  ? Redness, swelling, or pain.  ? Fluid or blood.  ? Warmth.  ? Pus or a bad smell.    Managing pain    · If directed, put ice on the painful area.  ? Put ice in a plastic bag.  ? Place a towel between your skin and the bag.  ? Leave the ice on for 20 minutes, 2-3 times a day.    General instructions  · Return to your  normal activities as told by your health care provider. Ask your health care provider what activities are safe for you.  · Pay close attention to how you feel after the procedure. If you start to have pain, write down when it hurts and how it feels. This will help you and your health care provider know if you need an additional treatment.  · Keep all follow-up visits as told by your health care provider. This is important.  Contact a health care provider if you have:  · Pain that does not get better.  · Redness, warmth, swelling, or pain at the needle insertion site.  · Fluid, blood, or pus coming from the needle insertion site.  · A fever.  Get help right away if you develop:  · Sudden, severe pain.  · Numbness or tingling near the insertion site and those symptoms do not go away.  Summary  · After the procedure, it is common to have slight pain and temporary numbness in the area where the procedure was done.  · Do not drive for 24 hours if you were given a sedative during your procedure.  · Pay close attention to how you feel after the procedure. If you start to have pain, write down when it hurts and how it feels. This will help you and your health care provider know if you need an additional treatment.  · Contact a health care provider if you have a fever or pain that does not get better, or if you notice redness, warmth, swelling, pain, fluid, blood, or pus at the insertion site.  · Get help right away if you develop sudden, severe pain, or numbness or tingling near the insertion site.  This information is not intended to replace advice given to you by your health care provider. Make sure you discuss any questions you have with your health care provider.  Document Revised: 09/05/2019 Document Reviewed: 09/05/2019  [employer] Patient Education © 2021 [employer]

## 2022-01-10 ENCOUNTER — OFFICE VISIT (OUTPATIENT)
Dept: FAMILY MEDICINE CLINIC | Facility: CLINIC | Age: 47
End: 2022-01-10

## 2022-01-10 VITALS
TEMPERATURE: 97.8 F | SYSTOLIC BLOOD PRESSURE: 108 MMHG | DIASTOLIC BLOOD PRESSURE: 77 MMHG | RESPIRATION RATE: 18 BRPM | WEIGHT: 160 LBS | HEIGHT: 66 IN | BODY MASS INDEX: 25.71 KG/M2 | OXYGEN SATURATION: 97 % | HEART RATE: 105 BPM

## 2022-01-10 DIAGNOSIS — J45.20 MILD INTERMITTENT ASTHMA, UNSPECIFIED WHETHER COMPLICATED: ICD-10-CM

## 2022-01-10 DIAGNOSIS — R50.9 FEVER, UNSPECIFIED FEVER CAUSE: Primary | ICD-10-CM

## 2022-01-10 DIAGNOSIS — Z20.822 CLOSE EXPOSURE TO COVID-19 VIRUS: ICD-10-CM

## 2022-01-10 DIAGNOSIS — J06.9 UPPER RESPIRATORY TRACT INFECTION, UNSPECIFIED TYPE: ICD-10-CM

## 2022-01-10 PROCEDURE — 99214 OFFICE O/P EST MOD 30 MIN: CPT | Performed by: FAMILY MEDICINE

## 2022-01-10 PROCEDURE — U0004 COV-19 TEST NON-CDC HGH THRU: HCPCS | Performed by: FAMILY MEDICINE

## 2022-01-10 RX ORDER — DEXTROMETHORPHAN POLISTIREX 30 MG/5ML
60 SUSPENSION ORAL 2 TIMES DAILY PRN
Qty: 280 ML | Refills: 0 | Status: SHIPPED | OUTPATIENT
Start: 2022-01-10 | End: 2022-02-10

## 2022-01-10 RX ORDER — DOXYCYCLINE 100 MG/1
100 CAPSULE ORAL 2 TIMES DAILY
Qty: 14 CAPSULE | Refills: 0 | Status: SHIPPED | OUTPATIENT
Start: 2022-01-10 | End: 2022-01-20

## 2022-01-10 NOTE — PATIENT INSTRUCTIONS
pullse oximteer to check oxygen levels  Tylenol as needed  rx for coughr [id number] a day as needed   albuterol as needed  Rx Get pudosycycline 2x a day for URI  Go to Er for worsening symptoms, decreased oxygen level

## 2022-01-10 NOTE — PROGRESS NOTES
Subjective   Taylor Cat is a 46 y.o. female.     Chief Complaint   Patient presents with   • Fever      covid+       History of Present Illness   exosed to  who was diagnosed with covid yesterday  She has hx asthma  She started having symptoms last night with feverr to 101, congestion, normal taste and smell    The following portions of the patient's history were reviewed and updated as appropriate: allergies, current medications, past family history, past medical history, past social history, past surgical history and problem list.    Past Medical History:   Diagnosis Date   • Anemia    • Arthritis    • Asthma    • Chronic back pain    • Chronic pain    • DDD, lumbar    • Depression    • Endometriosis    • Hemorrhoids    • IBS (irritable bowel syndrome)    • Insomnia    • Low back pain     DDD w/ Radiulitis   • Lumbar radiculitis    • MAMMO     NEG = 2016/ 2020   Dr. Crooks   • Migraine    • Myofasciitis    • PAP     NEG = 2019   • Spondylosis    • T12 Vert Fx     T12 from trauma       Past Surgical History:   Procedure Laterality Date   • CYST REMOVAL      Neck cyst sx as child   • D & C AND LAPAROSCOPY     • HYSTERECTOMY     • SUBTOTAL HYSTERECTOMY         Family History   Problem Relation Age of Onset   • Arthritis Mother    • Heart disease Mother    • Cancer Mother         Cervical Cancer   • Thyroid disease Mother    • Stroke Father    • Heart disease Father         CHF   • Alcohol abuse Father    • Irritable bowel syndrome Brother    • Thyroid disease Maternal Grandfather    • Heart disease Paternal Grandfather         CABG       Review of Systems   Constitutional: Positive for chills and fever.   HENT: Positive for congestion and sore throat.         Normal smell/taste   Respiratory: Positive for cough and shortness of breath.    Musculoskeletal: Positive for arthralgias.       Objective   Physical Exam  Vitals and nursing note reviewed.   Constitutional:       General: She is not in  acute distress.     Appearance: She is well-developed. She is obese. She is ill-appearing. She is not diaphoretic.   HENT:      Head: Normocephalic and atraumatic.      Right Ear: External ear normal.      Left Ear: External ear normal.      Nose: Nose normal.   Eyes:      Conjunctiva/sclera: Conjunctivae normal.      Pupils: Pupils are equal, round, and reactive to light.   Neck:      Thyroid: No thyromegaly.   Cardiovascular:      Rate and Rhythm: Normal rate and regular rhythm.      Heart sounds: Normal heart sounds. No murmur heard.  No friction rub. No gallop.    Pulmonary:      Effort: Pulmonary effort is normal.      Breath sounds: Normal breath sounds. No wheezing.   Abdominal:      General: Bowel sounds are normal.      Palpations: Abdomen is soft.      Tenderness: There is no abdominal tenderness.   Musculoskeletal:         General: No tenderness or deformity. Normal range of motion.      Cervical back: Normal range of motion and neck supple.   Lymphadenopathy:      Cervical: No cervical adenopathy.   Skin:     General: Skin is warm and dry.      Capillary Refill: Capillary refill takes less than 2 seconds.      Findings: No rash.   Neurological:      Mental Status: She is alert and oriented to person, place, and time.      Cranial Nerves: No cranial nerve deficit.   Psychiatric:         Behavior: Behavior normal.         Thought Content: Thought content normal.         Judgment: Judgment normal.         Vitals:    01/10/22 1617   BP: 108/77   Pulse: 105   Resp: 18   Temp: 97.8 °F (36.6 °C)   SpO2: 97%     Body mass index is 25.82 kg/m².    LDL Cholesterol    Date Value Ref Range Status   05/20/2021 138 (H) 0 - 100 mg/dL Final   08/09/2019 135 (H) 0 - 100 mg/dL Final   11/09/2018 151 (H) 0 - 100 mg/dL Final     TSH   Date Value Ref Range Status   05/08/2017 3.98 0.34 - 5.60 uIU/mL Final     Results for orders placed or performed in visit on 01/10/22   COVID-19,[facility] PANTHER(BRYCE),[facility], NP/OP SWAB  IN UTM/VTM/[facility]AL[facility] TRANSPORT MEDIA,24 HR TAT - Swab, Nasopharynx    Specimen: Nasopharynx; Swab   Result Value Ref Range    COVID19 Detected (C) Not Detected - Ref. Range   Results for orders placed or performed in visit on 05/20/21   Lipid Panel    Specimen: Blood   Result Value Ref Range    Total Cholesterol 192 0 - 200 mg/dL    Triglycerides 104 0 - 150 mg/dL    HDL Cholesterol 35 (L) 40 - 60 mg/dL    LDL Cholesterol  138 (H) 0 - 100 mg/dL    VLDL Cholesterol 19 5 - 40 mg/dL    LDL/HDL Ratio 3.89    Comprehensive Metabolic Panel    Specimen: Blood   Result Value Ref Range    Glucose 95 65 - 99 mg/dL    BUN 12 6 - 20 mg/dL    Creatinine 0.82 0.57 - 1.00 mg/dL    Sodium 139 136 - 145 mmol/L    Potassium 4.3 3.5 - 5.2 mmol/L    Chloride 108 (H) 98 - 107 mmol/L    CO2 24.9 22.0 - 29.0 mmol/L    Calcium 8.9 8.6 - 10.5 mg/dL    Total Protein 6.9 6.0 - 8.5 g/dL    Albumin 4.30 3.50 - 5.20 g/dL    ALT (SGPT) 13 1 - 33 U/L    AST (SGOT) 17 1 - 32 U/L    Alkaline Phosphatase 83 39 - 117 U/L    Total Bilirubin 0.3 0.0 - 1.2 mg/dL    eGFR Non African Amer 75 >60 mL/min/1.73    Globulin 2.6 gm/dL    A/G Ratio 1.7 g/dL    BUN/Creatinine Ratio 14.6 7.0 - 25.0    Anion Gap 6.1 5.0 - 15.0 mmol/L   Results for orders placed or performed in visit on 11/20/20   POC Urinalysis Dipstick, Automated    Specimen: Urine   Result Value Ref Range    Color Yellow Yellow, Straw, Dark Yellow, Inga    Clarity, UA Clear Clear    Specific Gravity  1.030 1.005 - 1.030    pH, Urine 6.0 5.0 - 8.0    Leukocytes Negative Negative    Nitrite, UA Negative Negative    Protein, POC Negative Negative mg/dL    Glucose, UA Negative Negative, 1000 mg/dL (3+) mg/dL    Ketones, UA Negative Negative    Urobilinogen, UA Normal Normal    Bilirubin Negative Negative    Blood, UA Negative Negative   Results for orders placed or performed in visit on 08/09/19   Lipid Panel    Specimen: Blood   Result Value Ref Range    Total Cholesterol 200 <=200 mg/dL     Triglycerides 270 (H) <=150 mg/dL    HDL Cholesterol 33 (L) >=39 mg/dL    LDL Cholesterol  135 (H) 0 - 100 mg/dL    VLDL Cholesterol 54 mg/dL    LDL/HDL Ratio 3.42     Chol/HDL Ratio 6.06    Comprehensive Metabolic Panel    Specimen: Blood   Result Value Ref Range    Glucose 92 65 - 99 mg/dL    BUN 15 8 - 20 mg/dL    Creatinine 1.00 0.40 - 1.00 mg/dL    Sodium 139 136 - 144 mmol/L    Potassium 4.7 3.6 - 5.1 mmol/L    Chloride 108 101 - 111 mmol/L    CO2 23.0 22.0 - 32.0 mmol/L    Calcium 9.1 8.9 - 10.3 mg/dL    Total Protein 6.6 6.1 - 7.9 g/dL    Albumin 4.00 3.50 - 4.80 g/dL    ALT (SGPT) 17 14 - 54 U/L    AST (SGOT) 22 15 - 41 U/L    Alkaline Phosphatase 72 32 - 91 U/L    Total Bilirubin 0.7 0.3 - 1.2 mg/dL    eGFR Non African Amer 61 >60 mL/min/1.73    Globulin 2.6 2.5 - 3.8 gm/dL    A/G Ratio 1.5 1.0 - 1.7 g/dL    BUN/Creatinine Ratio 15.0 5.4 - 26.2    Anion Gap 12.7 5.0 - 15.0 mmol/L       Assessment/Plan   Diagnoses and all orders for this visit:    1. Fever, unspecified fever cause (Primary)  -     COVID-19,[facility] ROUTINE, NP/OP SWAB IN TRANSPORT MEDIA OR ESWAB 72 HR TAT - Swab, Nasopharynx; Future  -     Cancel: COVID-19,[facility] ROUTINE, NP/OP SWAB IN TRANSPORT MEDIA OR ESWAB 72 HR TAT - Swab, Nasopharynx  -     COVID-19,[facility] PANTHER(BRYCE), [facility]/ [facility], NP/OP SWAB IN UTM/VTM/SALINE TRANSPORT MEDIA,24 HR TAT - Swab, Nasopharynx; Future  -     COVID-19,[facility] PANTHER(BRYCE), [facility]/ [facility], NP/OP SWAB IN UTM/VTM/SALINE TRANSPORT MEDIA,24 HR TAT - Swab, Nasopharynx    2. Upper respiratory tract infection, unspecified type  -     COVID-19,[facility] PANTHER(BRYCE),[facility], NP/OP SWAB IN UTM/VTM/SALINE TRANSPORT MEDIA,24 HR TAT - Swab, Nasopharynx; Future  -     COVID-19,[facility] PANTHER(BRYCE),[facility], NP/OP SWAB IN UTM/VTM/SALINE TRANSPORT MEDIA,24 HR TAT - Swab, Nasopharynx    3. Mild intermittent asthma, unspecified whether complicated  -     COVID-19,[facility] PANTHER(BRYCE),[facility], NP/OP SWAB IN  UTM/VTM/SALINE TRANSPORT MEDIA,24 HR TAT - Swab, Nasopharynx; Future  -     COVID-19,[facility] PANTHER(BRYCE),[facility], NP/OP SWAB IN UTM/VTM/SALINE TRANSPORT MEDIA,24 HR TAT - Swab, Nasopharynx    4. Close exposure to COVID-19 virus  -     COVID-19,[facility] PANTHER(BRYCE),[facility], NP/OP SWAB IN UTM/VTM/SALINE TRANSPORT MEDIA,24 HR TAT - Swab, Nasopharynx; Future  -     COVID-19,[facility] PANTHER(BRYCE),[facility], NP/OP SWAB IN UTM/VTM/SALINE TRANSPORT MEDIA,24 HR TAT - Swab, Nasopharynx    Other orders  -     dextromethorphan polistirex ER (Delsym) 30 MG/5ML Suspension Extended Release oral suspension; Take 10 mL by mouth 2 (Two) Times a Day As Needed (cough).  Dispense: 280 mL; Refill: 0  -     doxycycline (MONODOX) 100 MG capsule; Take 1 capsule by mouth 2 (Two) Times a Day.  Dispense: 14 capsule; Refill: 0        pullse oximteer to check oxygen levels  Quarantine as dierected  Tylenol as needed  rx for coughr [id number] a day as needed   albuterol as needed  Rx doxycycline 2x a day for URI  Go to Er for worsening symptoms, decreased oxygen level

## 2022-01-11 ENCOUNTER — TELEPHONE (OUTPATIENT)
Dept: FAMILY MEDICINE CLINIC | Facility: CLINIC | Age: 47
End: 2022-01-11

## 2022-01-11 LAB — SARS-COV-2 ORF1AB RESP QL NAA+PROBE: DETECTED

## 2022-01-20 ENCOUNTER — OFFICE VISIT (OUTPATIENT)
Dept: PAIN MEDICINE | Facility: CLINIC | Age: 47
End: 2022-01-20

## 2022-01-20 VITALS
OXYGEN SATURATION: 97 % | WEIGHT: 160 LBS | RESPIRATION RATE: 16 BRPM | BODY MASS INDEX: 25.71 KG/M2 | DIASTOLIC BLOOD PRESSURE: 65 MMHG | HEIGHT: 66 IN | SYSTOLIC BLOOD PRESSURE: 109 MMHG | HEART RATE: 97 BPM

## 2022-01-20 DIAGNOSIS — M79.18 MYOFASCIAL PAIN SYNDROME: ICD-10-CM

## 2022-01-20 DIAGNOSIS — G89.4 CHRONIC PAIN SYNDROME: Primary | ICD-10-CM

## 2022-01-20 DIAGNOSIS — M47.812 CERVICAL SPONDYLOSIS WITHOUT MYELOPATHY: ICD-10-CM

## 2022-01-20 DIAGNOSIS — M47.817 LUMBOSACRAL SPONDYLOSIS WITHOUT MYELOPATHY: ICD-10-CM

## 2022-01-20 PROCEDURE — 99214 OFFICE O/P EST MOD 30 MIN: CPT | Performed by: ANESTHESIOLOGY

## 2022-01-20 RX ORDER — METHOCARBAMOL 500 MG/1
500 TABLET, FILM COATED ORAL 2 TIMES DAILY PRN
Qty: 60 TABLET | Refills: 2 | Status: SHIPPED | OUTPATIENT
Start: 2022-01-20 | End: 2022-09-29 | Stop reason: SDUPTHER

## 2022-01-20 RX ORDER — FREMANEZUMAB-VFRM 225 MG/1.5ML
INJECTION SUBCUTANEOUS
COMMUNITY
Start: 2022-01-14 | End: 2022-01-20 | Stop reason: ALTCHOICE

## 2022-01-20 NOTE — PROGRESS NOTES
Subjective    CC back pain, right hip pain  Taylor Cat is a 46 y.o. female with chronic neck pain, back pain history of T12 fracture 2016, here for follow-up.  Good relief of lumbar RFA.  Continues to have right posterior hip and buttock pain.  Chronic back pain mostly axial occasionally radiating to bilateral hips and right lower extremity worse with standing walking or activity.  Denies injury, saddle anesthesia bladder bowel continence.  Chronic neck pain mostly myofascial mild relief with steroid pack and muscle relaxer.  Denies regular pain.    Back pain interfere with work despite accommodations/works at [employer].   Tried LESI in the past with good relief but was short-lived.  Tried muscle relaxers, anti-inflammatory, physical therapy with marginal relief.    Spine MRI 2021 facet degeneration primarily at L4-L5 of mild severity.  Negative for disc extrusion compression or fracture or subluxation.  L-spine x-ray 2017:  Stable remote fracture deformity with anterior wall wedge deformity of T12.  Otherwise mild degenerative changes.    Pain Assessment   Location of Pain: Lower Back, R Hip, L Hip, R Leg, neck pain, joint  Description of Pain: Dull/Aching, Throbbing, Stabbing  Previous Pain Rating :7  Current Pain Ratin  Aggravating Factors: Activity  Alleviating Factors: Rest, Medication    PEG Assessment   What number best describes your pain on average in the past week?4  What number best describes how, during the past week, pain has interfered with your enjoyment of life?3  What number best describes how, during the past week, pain has interfered with your general activity?  9    The following portions of the patient's history were reviewed and updated as appropriate: allergies, current medications, past family history, past medical history, past social history, past surgical history and problem list.     has a past medical history of Anemia, Arthritis, Asthma, Chronic back pain, Chronic pain,  "COVID-19, DDD, lumbar, Depression, Endometriosis, Hemorrhoids, IBS (irritable bowel syndrome), Insomnia, Low back pain, Lumbar radiculitis, MAMMO, Migraine, Myofasciitis, PAP, Spondylosis, and T12 Vert Fx.   has a past surgical history that includes Hysterectomy; d & c and laparoscopy; Cyst Removal; and Subtotal Hysterectomy.  family history includes Alcohol abuse in her father; Arthritis in her mother; Cancer in her mother; Heart disease in her father, mother, and paternal grandfather; Irritable bowel syndrome in her brother; Stroke in her father; Thyroid disease in her maternal grandfather and mother.  Social History     Tobacco Use   • Smoking status: Former Smoker     Packs/day: 0.25     Years: 4.00     Pack years: 1.00     Types: Cigarettes     Quit date:      Years since quittin.0   • Smokeless tobacco: Never Used   Substance Use Topics   • Alcohol use: No     Review of Systems   Musculoskeletal: Positive for arthralgias, back pain, myalgias and neck pain.   Neurological: Positive for headache.   All other systems reviewed and are negative.    Objective   Physical Exam  Vitals reviewed.   Constitutional:       General: She is not in acute distress.  Pulmonary:      Effort: Pulmonary effort is normal.   Musculoskeletal:      Cervical back: Spasms present. Decreased range of motion.      Lumbar back: Spasms and tenderness present. Positive right straight leg raise test.      Comments: Lumbar loading positive, pain on extension of low back past 5 degrees.  TTP on the lumbar facets noted.  Right SI tenderness, positive Renée.       /65   Pulse 97   Resp 16   Ht 167.6 cm (66\")   Wt 72.6 kg (160 lb)   SpO2 97%   BMI 25.82 kg/m²     PHQ 9 on chart  Opioid risk tool low risk    Assessment/Plan   Diagnoses and all orders for this visit:    1. Chronic pain syndrome (Primary)    2. Lumbosacral spondylosis without myelopathy    3. Myofascial pain syndrome  -     methocarbamol (ROBAXIN) 500 MG tablet; " Take 1 tablet by mouth 2 (Two) Times a Day As Needed for Muscle Spasms.  Dispense: 60 tablet; Refill: 2    4. Cervical spondylosis without myelopathy    Summary  Taylor Cat is a 45 y.o. female with chronic neck pain, back pain history of T12 fracture 2016, here for follow-up.  Chronic pain from lumbar DDD spondylosis with occasional right lower extremity radicular pain.  Chronic myofascial neck pain.     Recovered from COVID.  Good relief of lumbar RFA.  Continues to have right posterior hip and buttock pain.  Consider right SI injection.    Continue Robaxin as needed    RTC 6 months or for procedure

## 2022-01-21 NOTE — TELEPHONE ENCOUNTER
Last visit: 2/20/20   Next visit: none  Last labs: 8/9/19    Rx requested: tiZANidine 4 MG tablet  Pharmacy: [facility] in [place]    suicidality/psychosis

## 2022-01-28 ENCOUNTER — TELEPHONE (OUTPATIENT)
Dept: FAMILY MEDICINE CLINIC | Facility: CLINIC | Age: 47
End: 2022-01-28

## 2022-01-28 DIAGNOSIS — R07.89 OTHER CHEST PAIN: Primary | ICD-10-CM

## 2022-01-28 DIAGNOSIS — U07.1 COVID-19: ICD-10-CM

## 2022-01-28 NOTE — TELEPHONE ENCOUNTER
Caller: Taylor Cat    Relationship to patient: Self    Best call back number: [phone]     Patient is needing: PATIENT IS CALLING TO ASK DR. MEHTA IF SHE WOULD NEED TO GET A CHEST X-RAY.  SHE STATES SINCE HAVING COVID OVER 10 DAYS AGO, SHE HAS BEEN EXPERIENCING PRESSURE AND PAIN WHEN SHE TWISTS OR TRIES TO  SOMETHING.  SHE STATES THE PAIN/PRESSURE IS BETWEEN HER BREASTS.  SHE STATES SHE IS AFRAID THAT SHE MIGHT HAVE FLUID.      PLEASE ADVISE.

## 2022-01-29 ENCOUNTER — HOSPITAL ENCOUNTER (OUTPATIENT)
Dept: GENERAL RADIOLOGY | Facility: HOSPITAL | Age: 47
Discharge: HOME | End: 2022-01-29
Admitting: FAMILY MEDICINE

## 2022-01-29 DIAGNOSIS — U07.1 COVID-19: ICD-10-CM

## 2022-01-29 DIAGNOSIS — R07.89 OTHER CHEST PAIN: ICD-10-CM

## 2022-01-29 PROCEDURE — 71046 X-RAY EXAM CHEST 2 VIEWS: CPT

## 2022-02-10 ENCOUNTER — OFFICE VISIT (OUTPATIENT)
Dept: FAMILY MEDICINE CLINIC | Facility: CLINIC | Age: 47
End: 2022-02-10

## 2022-02-10 VITALS
HEIGHT: 66 IN | RESPIRATION RATE: 16 BRPM | WEIGHT: 173 LBS | DIASTOLIC BLOOD PRESSURE: 75 MMHG | BODY MASS INDEX: 27.8 KG/M2 | OXYGEN SATURATION: 97 % | SYSTOLIC BLOOD PRESSURE: 113 MMHG | TEMPERATURE: 98 F | HEART RATE: 74 BPM

## 2022-02-10 DIAGNOSIS — M89.8X8 MASS OF STERNUM: Primary | ICD-10-CM

## 2022-02-10 DIAGNOSIS — Z83.49 FAMILY HISTORY OF THYROID DISEASE: ICD-10-CM

## 2022-02-10 DIAGNOSIS — Z12.11 COLON CANCER SCREENING: ICD-10-CM

## 2022-02-10 DIAGNOSIS — R73.9 HYPERGLYCEMIA: ICD-10-CM

## 2022-02-10 DIAGNOSIS — E78.2 MIXED HYPERLIPIDEMIA: ICD-10-CM

## 2022-02-10 PROCEDURE — 99213 OFFICE O/P EST LOW 20 MIN: CPT | Performed by: FAMILY MEDICINE

## 2022-02-10 RX ORDER — FEXOFENADINE HCL 180 MG/1
180 TABLET ORAL DAILY
COMMUNITY

## 2022-02-10 NOTE — PROGRESS NOTES
Subjective   Taylor Cat is a 46 y.o. female.     Chief Complaint   Patient presents with   • Chest Pain     Patient states that she feels like she has a knot on her chest.          Current Outpatient Medications:   •  Aimovig 140 MG/ML prefilled syringe, , Disp: , Rfl:   •  albuterol sulfate  (90 Base) MCG/ACT inhaler, Inhale 2 puffs Every 6 (Six) Hours As Needed for Wheezing or Shortness of Air., Disp: 18 g, Rfl: 0  •  aspirin-acetaminophen-caffeine (EXCEDRIN MIGRAINE) 250-250-65 MG per tablet, Take 1 tablet by mouth Every 6 (Six) Hours As Needed for Headache., Disp: , Rfl:   •  fexofenadine (ALLEGRA) 180 MG tablet, Take 180 mg by mouth Daily., Disp: , Rfl:   •  ibuprofen (ADVIL,MOTRIN) 800 MG tablet, Take 800 mg by mouth Every 6 (Six) Hours As Needed for Mild Pain ., Disp: , Rfl:   •  meloxicam (MOBIC) 15 MG tablet, TAKE 1 TABLET BY MOUTH EVERY DAY (Patient taking differently: prn), Disp: 30 tablet, Rfl: 3  •  methocarbamol (ROBAXIN) 500 MG tablet, Take 1 tablet by mouth 2 (Two) Times a Day As Needed for Muscle Spasms., Disp: 60 tablet, Rfl: 2  •  ondansetron ODT (ZOFRAN-ODT) 4 MG disintegrating tablet, Place 1 tablet on the tongue Every 8 (Eight) Hours As Needed for Nausea or Vomiting., Disp: 30 tablet, Rfl: 0  •  rizatriptan MLT (MAXALT-MLT) 10 MG disintegrating tablet, TAKE 1 TABLET BY MOUTH AS NEEDED FOR MIGRAINES, MAY REPEAT EVERY 2 HOURS, MAX 3 A DAY, Disp: 6 tablet, Rfl: 3    Past Medical History:   Diagnosis Date   • Anemia    • Arthritis    • Asthma    • Chronic back pain    • COVID-19 2022   • DDD, lumbar    • Depression    • Endometriosis    • Hemorrhoids    • IBS    • Insomnia    • Low back pain     DDD w/ Radiulitis   • Lumbar radiculitis    • MAMMO     NEG = 2016/ 2020   Dr. Crooks   • Migraine    • Myofasciitis    • PAP     NEG = 2019   • Spondylosis    • T12 Vert Fx     T12 from trauma       Past Surgical History:   Procedure Laterality Date   • COLONOSCOPY     • CYST REMOVAL       Neck cyst sx as child   • D & C AND LAPAROSCOPY     • HYSTERECTOMY     • SUBTOTAL HYSTERECTOMY         Family History   Problem Relation Age of Onset   • Arthritis Mother    • Heart disease Mother    • Cancer Mother         Cervical Cancer   • Thyroid disease Mother    • Stroke Father    • Heart disease Father         CHF   • Alcohol abuse Father    • Irritable bowel syndrome Brother    • Thyroid disease Maternal Grandfather    • Heart disease Paternal Grandfather         CABG       Social History     Socioeconomic History   • Marital status:    Tobacco Use   • Smoking status: Former Smoker     Packs/day: 0.25     Years: 4.00     Pack years: 1.00     Types: Cigarettes     Quit date:      Years since quittin.1   • Smokeless tobacco: Never Used   Vaping Use   • Vaping Use: Former   • Substances: Flavoring   Substance and Sexual Activity   • Alcohol use: No   • Drug use: No   • Sexual activity: Defer       47 y/o C female here w/ c/o's sternal CP w/ some Rt sided radiation w/ movement since COVID 2022    Pt states she has noticed a bump on her upper breast bone x 2 weeks and some decrease in size since finding it and never noticed it before;   Pt states the CP w/ Rt sided radiation started after COVID and usu worse w/ certain movements; pt works as a  at [employer] and does some stretching exercises at work but not helping       The following portions of the patient's history were reviewed and updated as appropriate: allergies, current medications, past family history, past medical history, past social history, past surgical history and problem list.    Review of Systems   Constitutional: Negative for activity change, appetite change, unexpected weight gain and unexpected weight loss.   Respiratory: Negative for cough, shortness of breath and wheezing.    Cardiovascular: Positive for chest pain.   Skin: Positive for skin lesions. Negative for color change, rash, wound and bruise.       Vitals:     02/10/22 0938   BP: 113/75   Pulse: 74   Resp: 16   Temp: 98 °F (36.7 °C)   SpO2: 97%       Objective   Physical Exam  Vitals and nursing note reviewed.   Constitutional:       General: She is not in acute distress.     Appearance: She is well-developed. She is not ill-appearing or toxic-appearing.   HENT:      Head: Normocephalic and atraumatic.   Cardiovascular:      Rate and Rhythm: Normal rate and regular rhythm.      Heart sounds: Normal heart sounds. No murmur heard.      Pulmonary:      Effort: Pulmonary effort is normal. No respiratory distress.      Breath sounds: Normal breath sounds. No stridor. No wheezing, rhonchi or rales.   Chest:      Chest wall: Mass and tenderness present. No deformity, crepitus or edema.       Skin:     General: Skin is warm and dry.      Findings: No rash.   Neurological:      Mental Status: She is alert and oriented to person, place, and time.      Cranial Nerves: No cranial nerve deficit.   Psychiatric:         Mood and Affect: Mood normal.         Behavior: Behavior normal.         Thought Content: Thought content normal.         Judgment: Judgment normal.           Assessment/Plan   Diagnoses and all orders for this visit:    1. Mass of sternum (Primary)    2. Mixed hyperlipidemia  -     Lipid Panel; Future    3. Hyperglycemia  -     Comprehensive Metabolic Panel; Future    4. Family history of thyroid disease  -     T4, Free; Future  -     TSH; Future    5. Colon cancer screening  -     Cologuard - Stool, Per Rectum; Future      disc'd w/ pt that mass feels like a lipoma and can get US if size changes

## 2022-03-07 RX ORDER — MELOXICAM 15 MG/1
15 TABLET ORAL DAILY PRN
Qty: 30 TABLET | Refills: 3 | Status: SHIPPED | OUTPATIENT
Start: 2022-03-07 | End: 2022-08-05

## 2022-03-07 NOTE — TELEPHONE ENCOUNTER
Rx Refill Note  Requested Prescriptions     Pending Prescriptions Disp Refills   • meloxicam (MOBIC) 15 MG tablet [Pharmacy Med Name: MELOXICAM 15 MG TABLET] 30 tablet 3     Sig: TAKE 1 TABLET BY MOUTH EVERY DAY      Last office visit with prescribing clinician: 2/10/2022      Next office visit with prescribing clinician: Visit date not found     Office Visit with Elise Freire DO (02/10/2022)  TSH (02/10/2022 10:07)  T4, Free (02/10/2022 10:07)  Comprehensive Metabolic Panel (02/10/2022 10:07)  Lipid Panel (02/10/2022 10:07)         Logan Radford  03/07/22, 12:49 EST

## 2022-05-27 ENCOUNTER — CLINICAL SUPPORT (OUTPATIENT)
Dept: FAMILY MEDICINE CLINIC | Facility: CLINIC | Age: 47
End: 2022-05-27

## 2022-05-27 DIAGNOSIS — Z83.49 FAMILY HISTORY OF THYROID DISEASE: ICD-10-CM

## 2022-05-27 DIAGNOSIS — R73.9 HYPERGLYCEMIA: ICD-10-CM

## 2022-05-27 DIAGNOSIS — E78.2 MIXED HYPERLIPIDEMIA: ICD-10-CM

## 2022-05-27 PROCEDURE — 84439 ASSAY OF FREE THYROXINE: CPT | Performed by: FAMILY MEDICINE

## 2022-05-27 PROCEDURE — 80061 LIPID PANEL: CPT | Performed by: FAMILY MEDICINE

## 2022-05-27 PROCEDURE — 36415 COLL VENOUS BLD VENIPUNCTURE: CPT | Performed by: FAMILY MEDICINE

## 2022-05-27 PROCEDURE — 80053 COMPREHEN METABOLIC PANEL: CPT | Performed by: FAMILY MEDICINE

## 2022-05-27 PROCEDURE — 84443 ASSAY THYROID STIM HORMONE: CPT | Performed by: FAMILY MEDICINE

## 2022-05-27 NOTE — PROGRESS NOTES
Venipuncture performed in L ARM by RT Leonor  with good hemostasis. Patient tolerated well. 05/27/22 June Rucker, RT

## 2022-05-28 LAB
ALBUMIN SERPL-MCNC: 4.2 G/DL (ref 3.5–5.2)
ALBUMIN/GLOB SERPL: 1.9 G/DL
ALP SERPL-CCNC: 101 U/L (ref 39–117)
ALT SERPL W P-5'-P-CCNC: 27 U/L (ref 1–33)
ANION GAP SERPL CALCULATED.3IONS-SCNC: 10.5 MMOL/L (ref 5–15)
AST SERPL-CCNC: 20 U/L (ref 1–32)
BILIRUB SERPL-MCNC: 0.2 MG/DL (ref 0–1.2)
BUN SERPL-MCNC: 12 MG/DL (ref 6–20)
BUN/CREAT SERPL: 13.8 (ref 7–25)
CALCIUM SPEC-SCNC: 8.8 MG/DL (ref 8.6–10.5)
CHLORIDE SERPL-SCNC: 110 MMOL/L (ref 98–107)
CHOLEST SERPL-MCNC: 179 MG/DL (ref 0–200)
CO2 SERPL-SCNC: 21.5 MMOL/L (ref 22–29)
CREAT SERPL-MCNC: 0.87 MG/DL (ref 0.57–1)
EGFRCR SERPLBLD CKD-EPI 2021: 83.3 ML/MIN/1.73
GLOBULIN UR ELPH-MCNC: 2.2 GM/DL
GLUCOSE SERPL-MCNC: 99 MG/DL (ref 65–99)
HDLC SERPL-MCNC: 34 MG/DL (ref 40–60)
LDLC SERPL CALC-MCNC: 97 MG/DL (ref 0–100)
LDLC/HDLC SERPL: 2.6 {RATIO}
POTASSIUM SERPL-SCNC: 3.8 MMOL/L (ref 3.5–5.2)
PROT SERPL-MCNC: 6.4 G/DL (ref 6–8.5)
SODIUM SERPL-SCNC: 142 MMOL/L (ref 136–145)
T4 FREE SERPL-MCNC: 0.91 NG/DL (ref 0.93–1.7)
TRIGL SERPL-MCNC: 283 MG/DL (ref 0–150)
TSH SERPL DL<=0.05 MIU/L-ACNC: 7.58 UIU/ML (ref 0.27–4.2)
VLDLC SERPL-MCNC: 48 MG/DL (ref 5–40)

## 2022-06-07 ENCOUNTER — TELEPHONE (OUTPATIENT)
Dept: FAMILY MEDICINE CLINIC | Facility: CLINIC | Age: 47
End: 2022-06-07

## 2022-07-12 ENCOUNTER — OFFICE VISIT (OUTPATIENT)
Dept: FAMILY MEDICINE CLINIC | Facility: CLINIC | Age: 47
End: 2022-07-12

## 2022-07-12 VITALS
RESPIRATION RATE: 18 BRPM | HEIGHT: 66 IN | OXYGEN SATURATION: 97 % | DIASTOLIC BLOOD PRESSURE: 84 MMHG | HEART RATE: 71 BPM | BODY MASS INDEX: 27.97 KG/M2 | SYSTOLIC BLOOD PRESSURE: 122 MMHG | WEIGHT: 174 LBS | TEMPERATURE: 98 F

## 2022-07-12 DIAGNOSIS — M76.61 ACHILLES TENDINITIS OF BOTH LOWER EXTREMITIES: ICD-10-CM

## 2022-07-12 DIAGNOSIS — E03.9 ACQUIRED HYPOTHYROIDISM: Primary | ICD-10-CM

## 2022-07-12 DIAGNOSIS — Z12.31 VISIT FOR SCREENING MAMMOGRAM: ICD-10-CM

## 2022-07-12 DIAGNOSIS — M76.62 ACHILLES TENDINITIS OF BOTH LOWER EXTREMITIES: ICD-10-CM

## 2022-07-12 PROCEDURE — 99214 OFFICE O/P EST MOD 30 MIN: CPT | Performed by: FAMILY MEDICINE

## 2022-07-12 RX ORDER — GALCANEZUMAB 120 MG/ML
INJECTION, SOLUTION SUBCUTANEOUS
COMMUNITY
Start: 2022-06-06

## 2022-07-12 RX ORDER — LEVOTHYROXINE SODIUM 0.05 MG/1
50 TABLET ORAL DAILY
Qty: 90 TABLET | Refills: 0 | Status: SHIPPED | OUTPATIENT
Start: 2022-07-12 | End: 2022-09-17 | Stop reason: SDUPTHER

## 2022-07-18 ENCOUNTER — OFFICE VISIT (OUTPATIENT)
Dept: PAIN MEDICINE | Facility: CLINIC | Age: 47
End: 2022-07-18

## 2022-07-18 VITALS
WEIGHT: 174 LBS | BODY MASS INDEX: 27.97 KG/M2 | DIASTOLIC BLOOD PRESSURE: 72 MMHG | HEART RATE: 78 BPM | RESPIRATION RATE: 16 BRPM | SYSTOLIC BLOOD PRESSURE: 126 MMHG | HEIGHT: 66 IN | OXYGEN SATURATION: 96 %

## 2022-07-18 DIAGNOSIS — M47.817 LUMBOSACRAL SPONDYLOSIS WITHOUT MYELOPATHY: ICD-10-CM

## 2022-07-18 DIAGNOSIS — M79.641 BILATERAL HAND PAIN: ICD-10-CM

## 2022-07-18 DIAGNOSIS — M46.1 SACROILIITIS: ICD-10-CM

## 2022-07-18 DIAGNOSIS — M47.812 CERVICAL SPONDYLOSIS WITHOUT MYELOPATHY: ICD-10-CM

## 2022-07-18 DIAGNOSIS — G89.4 CHRONIC PAIN SYNDROME: Primary | ICD-10-CM

## 2022-07-18 DIAGNOSIS — M79.642 BILATERAL HAND PAIN: ICD-10-CM

## 2022-07-18 PROCEDURE — 99214 OFFICE O/P EST MOD 30 MIN: CPT | Performed by: ANESTHESIOLOGY

## 2022-07-18 NOTE — PROGRESS NOTES
Subjective    CC back pain, right hip pain  Taylor Cat is a 46 y.o. female with chronic neck pain, back pain history of T12 fracture 2016, here for follow-up.  Complains of continues to have right posterior hip and buttock pain.  This is impairing ADL and interfering with work.  Also reports worsening bilateral hand neuropathic pain radiating to elbow.  Chronic back pain mostly axial occasionally radiating to bilateral hips and right lower extremity worse with standing walking or activity.  Denies injury, saddle anesthesia bladder bowel continence.  Chronic neck pain mostly myofascial mild relief with steroid pack and muscle relaxer.  Denies regular pain.    Back pain interfere with work despite accommodations/works at [employer].   Tried LESI in the past with good relief but was short-lived.  Tried muscle relaxers, anti-inflammatory, physical therapy with marginal relief.    Spine MRI 2021 facet degeneration primarily at L4-L5 of mild severity.  Negative for disc extrusion compression or fracture or subluxation.  L-spine x-ray 2017:  Stable remote fracture deformity with anterior wall wedge deformity of T12.  Otherwise mild degenerative changes.    Pain Assessment   Location of Pain: Lower Back, R Hip, L Hip, R Leg, neck pain, joint  Description of Pain: Dull/Aching, Throbbing, Stabbing  Previous Pain Rating :4  Current Pain Ratin  Aggravating Factors: Activity  Alleviating Factors: Rest, Medication    PEG Assessment   What number best describes your pain on average in the past week?4  What number best describes how, during the past week, pain has interfered with your enjoyment of life?2  What number best describes how, during the past week, pain has interfered with your general activity?  9    The following portions of the patient's history were reviewed and updated as appropriate: allergies, current medications, past family history, past medical history, past social history, past surgical history and  problem list.     has a past medical history of Anemia, Anxiety, Arthritis, Asthma, Chronic back pain, COVID-19 (), DDD, lumbar, Depression, Endometriosis, Hemorrhoids, IBS, Insomnia, Low back pain, Lumbar radiculitis, MAMMO, Migraine, Myofasciitis, PAP, Spondylosis, T12 Vert Fx, and Thyroid disease.   has a past surgical history that includes Hysterectomy; d & c and laparoscopy; Cyst Removal; Subtotal Hysterectomy; and Colonoscopy.  family history includes Alcohol abuse in her father and sister; Anxiety disorder in her daughter, daughter, maternal grandfather, mother, and paternal grandmother; Arthritis in her mother and paternal grandmother; Cancer in her paternal grandmother; Dementia in her paternal grandmother; Depression in her father and paternal grandmother; Developmental Disability in her paternal uncle; Drug abuse in her sister; Heart disease in her father, maternal grandfather, mother, and paternal grandfather; Jyoti's disease in her paternal grandmother; Hyperlipidemia in her mother and paternal grandfather; Hypertension in her paternal grandfather; Irritable bowel syndrome in her brother; Learning disabilities in her paternal uncle; Stroke in her father; Thyroid disease in her daughter, maternal grandfather, maternal grandmother, and mother.  Social History     Tobacco Use   • Smoking status: Former Smoker     Packs/day: 0.25     Years: 4.00     Pack years: 1.00     Types: Cigarettes, Cigarettes     Quit date: 2010     Years since quittin.5   • Smokeless tobacco: Never Used   Substance Use Topics   • Alcohol use: No     Review of Systems   Musculoskeletal: Positive for arthralgias, back pain, myalgias and neck pain.   Neurological: Positive for headache.   All other systems reviewed and are negative.    Objective   Physical Exam  Vitals reviewed.   Constitutional:       General: She is not in acute distress.  Pulmonary:      Effort: Pulmonary effort is normal.   Musculoskeletal:       "Cervical back: Spasms present. Decreased range of motion.      Lumbar back: Spasms and tenderness present. Positive right straight leg raise test.      Comments: Lumbar loading positive, pain on extension of low back past 5 degrees.  TTP on the lumbar facets noted.  Right SI tenderness, positive Renée.       /72   Pulse 78   Resp 16   Ht 167.6 cm (66\")   Wt 78.9 kg (174 lb)   SpO2 96%   BMI 28.08 kg/m²     PHQ 9 on chart  Opioid risk tool low risk    Assessment & Plan   Diagnoses and all orders for this visit:    1. Chronic pain syndrome (Primary)    2. Lumbosacral spondylosis without myelopathy    3. Sacroiliitis (HCC)  -     SI Joint Injection    4. Cervical spondylosis without myelopathy    5. Bilateral hand pain  -     Ambulatory Referral to Hand Surgery    Summary  Taylor Cat is a 46 y.o. female with chronic neck pain, back pain history of T12 fracture 2016, here for follow-up.  Chronic pain from lumbar DDD spondylosis with occasional right lower extremity radicular pain.  Chronic myofascial neck pain.  Repeat lumbar RFA as needed.     Complains of continues to have right posterior hip and buttock pain.  This is impairing ADL and interfering with work.  Right SI provocative test positive.  We will schedule for diagnostic and therapeutic right SI injection.  Risks and benefits discussed.    Also reports worsening bilateral hand neuropathic pain radiating to elbow.  She has a history of both carpal tunnel syndrome and cervical radiculitis.    Referral to hand center for evaluation.    Consider right SI injection.    Continue Robaxin as needed    RTC for procedure  "

## 2022-08-05 RX ORDER — MELOXICAM 15 MG/1
15 TABLET ORAL DAILY PRN
Qty: 30 TABLET | Refills: 3 | Status: SHIPPED | OUTPATIENT
Start: 2022-08-05 | End: 2023-02-03

## 2022-08-05 NOTE — TELEPHONE ENCOUNTER
Rx Refill Note  Requested Prescriptions     Pending Prescriptions Disp Refills   • meloxicam (MOBIC) 15 MG tablet [Pharmacy Med Name: MELOXICAM 15 MG TABLET] 30 tablet 3     Sig: TAKE 1 TABLET BY MOUTH DAILY AS NEEDED FOR MODERATE PAIN      Last office visit with prescribing clinician: 7/12/2022      Next office visit with prescribing clinician: Visit date not found     Lipid Panel (05/27/2022 10:38)  Comprehensive Metabolic Panel (05/27/2022 10:38)         Darlyn Jeffries CMA  08/05/22, 09:07 EDT

## 2022-09-16 ENCOUNTER — CLINICAL SUPPORT (OUTPATIENT)
Dept: FAMILY MEDICINE CLINIC | Facility: CLINIC | Age: 47
End: 2022-09-16

## 2022-09-16 ENCOUNTER — HOSPITAL ENCOUNTER (OUTPATIENT)
Dept: PAIN MEDICINE | Facility: HOSPITAL | Age: 47
Discharge: HOME | End: 2022-09-16

## 2022-09-16 VITALS
RESPIRATION RATE: 16 BRPM | OXYGEN SATURATION: 97 % | DIASTOLIC BLOOD PRESSURE: 72 MMHG | SYSTOLIC BLOOD PRESSURE: 101 MMHG | HEART RATE: 56 BPM | TEMPERATURE: 97.1 F | HEIGHT: 66 IN | BODY MASS INDEX: 27.97 KG/M2 | WEIGHT: 174 LBS

## 2022-09-16 DIAGNOSIS — R52 PAIN: ICD-10-CM

## 2022-09-16 DIAGNOSIS — E03.9 ACQUIRED HYPOTHYROIDISM: ICD-10-CM

## 2022-09-16 DIAGNOSIS — M46.1 SACROILIITIS: ICD-10-CM

## 2022-09-16 PROCEDURE — 25010000002 METHYLPREDNISOLONE PER 40 MG: Performed by: ANESTHESIOLOGY

## 2022-09-16 PROCEDURE — 36415 COLL VENOUS BLD VENIPUNCTURE: CPT | Performed by: FAMILY MEDICINE

## 2022-09-16 PROCEDURE — 84443 ASSAY THYROID STIM HORMONE: CPT | Performed by: FAMILY MEDICINE

## 2022-09-16 PROCEDURE — 0 IOPAMIDOL 41 % SOLUTION: Performed by: ANESTHESIOLOGY

## 2022-09-16 PROCEDURE — 27096 INJECT SACROILIAC JOINT: CPT | Performed by: ANESTHESIOLOGY

## 2022-09-16 PROCEDURE — 84439 ASSAY OF FREE THYROXINE: CPT | Performed by: FAMILY MEDICINE

## 2022-09-16 PROCEDURE — 77003 FLUOROGUIDE FOR SPINE INJECT: CPT

## 2022-09-16 RX ORDER — BUPIVACAINE HYDROCHLORIDE 2.5 MG/ML
10 INJECTION, SOLUTION EPIDURAL; INFILTRATION; INTRACAUDAL ONCE
Status: COMPLETED | OUTPATIENT
Start: 2022-09-16 | End: 2022-09-16

## 2022-09-16 RX ORDER — METHYLPREDNISOLONE ACETATE 40 MG/ML
40 INJECTION, SUSPENSION INTRA-ARTICULAR; INTRALESIONAL; INTRAMUSCULAR; SOFT TISSUE ONCE
Status: COMPLETED | OUTPATIENT
Start: 2022-09-16 | End: 2022-09-16

## 2022-09-16 RX ADMIN — METHYLPREDNISOLONE ACETATE 40 MG: 40 INJECTION, SUSPENSION INTRA-ARTICULAR; INTRALESIONAL; INTRAMUSCULAR; INTRASYNOVIAL; SOFT TISSUE at 11:44

## 2022-09-16 RX ADMIN — BUPIVACAINE HYDROCHLORIDE 10 ML: 2.5 INJECTION, SOLUTION EPIDURAL; INFILTRATION; INTRACAUDAL; PERINEURAL at 11:44

## 2022-09-16 RX ADMIN — IOPAMIDOL 3 ML: 408 INJECTION, SOLUTION INTRATHECAL at 11:43

## 2022-09-16 NOTE — PROGRESS NOTES
Venipuncture performed in L ARM by RT Leonor  with good hemostasis. Patient tolerated well. 09/16/22 June Rucker, RT

## 2022-09-16 NOTE — PROCEDURES
"Subjective    Cc back pain  Taylor Cat is a 46 y.o. female with sacroiliitis here for right SI injection.  No anticoagulation    Pain Assessment   Location of Pain: Lower Back, R Hip, L Hip,   Description of Pain: Dull/Aching, Throbbing, Stabbing  Previous Pain Rating :7  Current Pain Ratin  Aggravating Factors: Activity  Alleviating Factors: Rest, Medication    The following portions of the patient's history were reviewed and updated as appropriate: allergies, current medications, past family history, past medical history, past social history, past surgical history and problem list.      Review of Systems  As in HPI  Objective   Physical Exam  Constitutional:       General: She is not in acute distress.     Appearance: She is well-developed.   Cardiovascular:      Rate and Rhythm: Normal rate.   Pulmonary:      Effort: Pulmonary effort is normal.   Musculoskeletal:      Lumbar back: Tenderness present. Decreased range of motion.   Neurological:      Mental Status: She is alert and oriented to person, place, and time.       /72 (BP Location: Left arm, Patient Position: Sitting)   Pulse 56   Temp 97.1 °F (36.2 °C) (Skin)   Resp 16   Ht 167.6 cm (66\")   Wt 78.9 kg (174 lb)   SpO2 97%   BMI 28.08 kg/m²     Assessment & Plan    underwent right SI injection      RTC for procedure    DATE OF PROCEDURE:  2022    PREOPERATIVE DIAGNOSIS:  sacroiliitis    POSTOPERATIVE DIAGNOSIS: same    PROCEDURE PERFORMED:  right SACROILIAC JOINT INJECTION    The patient understands the risks and benefits of the procedure and wishes to proceed. The patient was seen in the preoperative area. Patient's consent was obtained and updated. Vitals were taken. Patient was then brought to the procedure suite and placed in prone position for  sacroiliac joint injection. The appropriate anatomic area was widely prepped with Chloraprep and draped in a sterile fashion. Noninvasive monitoring per routine anesthesia protocol " was placed. Under fluoroscopic guidance using an AP view, a 22 gauge curved tip spinal needle was passed through skin anesthetized with 1% Lidocaine without epinephrine. The needle tip was guided to the lower pole of the joint using fluoroscopy. 1 mL of  preservative free contrast was injected into the joint to confirm location. A clear outline was obtained and 5 mL of steroid solution containing 4 mL 0.25% bupivacaine, and 1mL 40mg Depomedrol was injected. The patient tolerated with no evans-procedural complications.  A sterile dressing was placed over the puncture sites.

## 2022-09-17 LAB
T4 FREE SERPL-MCNC: 1.19 NG/DL (ref 0.93–1.7)
TSH SERPL DL<=0.05 MIU/L-ACNC: 1.3 UIU/ML (ref 0.27–4.2)

## 2022-09-17 RX ORDER — LEVOTHYROXINE SODIUM 0.05 MG/1
50 TABLET ORAL DAILY
Qty: 90 TABLET | Refills: 2 | Status: SHIPPED | OUTPATIENT
Start: 2022-09-17

## 2022-09-19 ENCOUNTER — TELEPHONE (OUTPATIENT)
Dept: PAIN MEDICINE | Facility: CLINIC | Age: 47
End: 2022-09-19

## 2022-09-26 ENCOUNTER — TELEPHONE (OUTPATIENT)
Dept: PAIN MEDICINE | Facility: CLINIC | Age: 47
End: 2022-09-26

## 2022-09-26 RX ORDER — ONDANSETRON 4 MG/1
4 TABLET, ORALLY DISINTEGRATING ORAL EVERY 8 HOURS PRN
Qty: 30 TABLET | Refills: 0 | Status: SHIPPED | OUTPATIENT
Start: 2022-09-26 | End: 2022-11-28

## 2022-09-26 NOTE — TELEPHONE ENCOUNTER
PT CAME INTO OFFICE IN PAIN SAYING ITS HARD TO SIT STAND AND WALK AND SHE IS IN EXTREME PAIN AND DOESN'T KNOW WHAT TO DO.

## 2022-09-29 DIAGNOSIS — M54.16 LUMBAR RADICULITIS: ICD-10-CM

## 2022-09-29 DIAGNOSIS — M79.18 MYOFASCIAL PAIN SYNDROME: ICD-10-CM

## 2022-09-29 DIAGNOSIS — G89.4 CHRONIC PAIN SYNDROME: Primary | ICD-10-CM

## 2022-09-29 RX ORDER — METHOCARBAMOL 500 MG/1
500 TABLET, FILM COATED ORAL 3 TIMES DAILY
Qty: 60 TABLET | Refills: 2 | Status: SHIPPED | OUTPATIENT
Start: 2022-09-29

## 2022-09-29 RX ORDER — METHYLPREDNISOLONE 4 MG/1
TABLET ORAL
Qty: 21 TABLET | Refills: 0 | Status: SHIPPED | OUTPATIENT
Start: 2022-09-29 | End: 2023-02-02

## 2022-09-29 RX ORDER — TRAMADOL HYDROCHLORIDE 50 MG/1
50 TABLET ORAL 2 TIMES DAILY PRN
Qty: 14 TABLET | Refills: 0 | Status: SHIPPED | OUTPATIENT
Start: 2022-09-29 | End: 2023-02-02

## 2022-09-29 NOTE — TELEPHONE ENCOUNTER
Called and spoke with patient about her symptoms.  Had 80% relief after right SI injection for a week then pain returned with pain in the right buttock radiating to the right leg with burning and tingling.  She tried heat and meloxicam muscle relaxers without relief.  Denies bladder bowel incontinence.  We will send Medrol pack, methocarbamol, and a short course of tramadol to pharmacy.  Continue meloxicam as well.  If not improved in a week come in for evaluation.  Inspect reviewed, L-spine MRI reviewed.

## 2022-11-28 RX ORDER — METHYLPREDNISOLONE 4 MG/1
TABLET ORAL
Qty: 21 EACH | OUTPATIENT
Start: 2022-11-28

## 2022-11-28 RX ORDER — ONDANSETRON 4 MG/1
TABLET, ORALLY DISINTEGRATING ORAL
Qty: 30 TABLET | Refills: 0 | Status: SHIPPED | OUTPATIENT
Start: 2022-11-28

## 2022-11-28 NOTE — TELEPHONE ENCOUNTER
Rx Refill Note  Requested Prescriptions     Pending Prescriptions Disp Refills   • ondansetron ODT (ZOFRAN-ODT) 4 MG disintegrating tablet [Pharmacy Med Name: ONDANSETRON ODT 4 MG TABLET] 30 tablet 0     Sig: PLACE 1 TABLET ON THE TONGUE EVERY 8 HOURS AS NEEDED FOR NAUSEA AND VOMITING      Last office visit with prescribing clinician: 7/12/2022      Next office visit with prescribing clinician: Visit date not found            Darlyn Whitt CMA  11/28/22, 12:44 EST

## 2023-02-02 ENCOUNTER — OFFICE VISIT (OUTPATIENT)
Dept: PAIN MEDICINE | Facility: CLINIC | Age: 48
End: 2023-02-02
Payer: COMMERCIAL

## 2023-02-02 VITALS
SYSTOLIC BLOOD PRESSURE: 117 MMHG | HEART RATE: 79 BPM | OXYGEN SATURATION: 96 % | RESPIRATION RATE: 16 BRPM | DIASTOLIC BLOOD PRESSURE: 76 MMHG

## 2023-02-02 DIAGNOSIS — M47.812 CERVICAL SPONDYLOSIS WITHOUT MYELOPATHY: ICD-10-CM

## 2023-02-02 DIAGNOSIS — M54.16 LUMBAR RADICULITIS: ICD-10-CM

## 2023-02-02 DIAGNOSIS — G89.4 CHRONIC PAIN SYNDROME: Primary | ICD-10-CM

## 2023-02-02 DIAGNOSIS — M47.817 LUMBOSACRAL SPONDYLOSIS WITHOUT MYELOPATHY: ICD-10-CM

## 2023-02-02 DIAGNOSIS — M79.18 MYOFASCIAL PAIN SYNDROME: ICD-10-CM

## 2023-02-02 PROCEDURE — 99214 OFFICE O/P EST MOD 30 MIN: CPT | Performed by: ANESTHESIOLOGY

## 2023-02-02 RX ORDER — OMEPRAZOLE 20 MG/1
CAPSULE, DELAYED RELEASE ORAL
COMMUNITY
Start: 2023-01-10

## 2023-02-02 RX ORDER — DULOXETIN HYDROCHLORIDE 30 MG/1
30 CAPSULE, DELAYED RELEASE ORAL 2 TIMES DAILY
Qty: 60 CAPSULE | Refills: 0 | Status: SHIPPED | OUTPATIENT
Start: 2023-02-02 | End: 2023-03-02

## 2023-02-03 NOTE — PROGRESS NOTES
Subjective    CC back pain, right hip pain  Taylor Cat is a 47 y.o. female with chronic neck pain, back pain history of T12 fracture 2016, here for follow-up.  Complains of continues to have right posterior hip and buttock pain.  This is impairing ADL and interfering with work.  Also reports worsening bilateral hand neuropathic pain radiating to elbow.  Chronic back pain mostly axial occasionally radiating to bilateral hips and right lower extremity worse with standing walking or activity.  Denies injury, saddle anesthesia bladder bowel continence.  Chronic neck pain mostly myofascial mild relief with steroid pack and muscle relaxer.  Denies regular pain.    Back pain interfere with work despite accommodations/works at [employer].   Tried LESI in the past with good relief but was short-lived.  Tried muscle relaxers, anti-inflammatory, physical therapy with marginal relief.    Spine MRI 2021 facet degeneration primarily at L4-L5 of mild severity.  Negative for disc extrusion compression or fracture or subluxation.  L-spine x-ray 2017:  Stable remote fracture deformity with anterior wall wedge deformity of T12.  Otherwise mild degenerative changes.    Pain Assessment   Location of Pain: Lower Back, R Hip, L Hip, R Leg, neck pain, joint  Description of Pain: Dull/Aching, Throbbing, Stabbing  Previous Pain Rating :5  Current Pain Ratin  Aggravating Factors: Activity  Alleviating Factors: Rest, Medication    PEG Assessment   What number best describes your pain on average in the past week?4  What number best describes how, during the past week, pain has interfered with your enjoyment of life?2  What number best describes how, during the past week, pain has interfered with your general activity?  8    The following portions of the patient's history were reviewed and updated as appropriate: allergies, current medications, past family history, past medical history, past social history, past surgical history and  problem list.     has a past medical history of Anemia, Anxiety, Arthritis, Asthma, Chronic back pain, COVID-19 (), DDD, lumbar, Depression, Endometriosis, GERD (gastroesophageal reflux disease), Hemorrhoids, IBS, Insomnia, Low back pain, Lumbar radiculitis, MAMMO, Migraine, Myofasciitis, PAP, Spondylosis, T12 Vert Fx, and Thyroid disease.   has a past surgical history that includes Hysterectomy; d & c and laparoscopy; Cyst Removal; Subtotal Hysterectomy; and Colonoscopy.  family history includes Alcohol abuse in her father and sister; Anxiety disorder in her daughter, daughter, maternal grandfather, mother, and paternal grandmother; Arthritis in her mother and paternal grandmother; Cancer in her paternal grandmother; Dementia in her paternal grandmother; Depression in her father and paternal grandmother; Developmental Disability in her paternal uncle; Drug abuse in her sister; Heart disease in her father, maternal grandfather, mother, and paternal grandfather; Posey's disease in her paternal grandmother; Hyperlipidemia in her mother and paternal grandfather; Hypertension in her paternal grandfather; Irritable bowel syndrome in her brother; Learning disabilities in her paternal uncle; Stroke in her father; Thyroid disease in her daughter, maternal grandfather, maternal grandmother, and mother.  Social History     Tobacco Use   • Smoking status: Former     Packs/day: 0.25     Years: 4.00     Pack years: 1.00     Types: Cigarettes     Quit date: 2010     Years since quittin.0   • Smokeless tobacco: Never   Substance Use Topics   • Alcohol use: No     Review of Systems   Musculoskeletal: Positive for arthralgias, back pain, myalgias and neck pain.   Neurological: Positive for headache.   All other systems reviewed and are negative.    Objective   Physical Exam  Vitals reviewed.   Constitutional:       General: She is not in acute distress.  Pulmonary:      Effort: Pulmonary effort is normal.    Musculoskeletal:      Cervical back: Spasms present. Decreased range of motion.      Lumbar back: Spasms and tenderness present. Positive right straight leg raise test.      Comments: Lumbar loading positive, pain on extension of low back past 5 degrees.  TTP on the lumbar facets noted.  Right SI tenderness, positive Renée.       /76   Pulse 79   Resp 16   SpO2 96%     PHQ 9 on chart  Opioid risk tool low risk    Assessment & Plan   Diagnoses and all orders for this visit:    1. Chronic pain syndrome (Primary)  -     DULoxetine (CYMBALTA) 30 MG capsule; Take 1 capsule by mouth 2 (Two) Times a Day. Take one cap daily x7 days and then 2 caps daily.  Dispense: 60 capsule; Refill: 0    2. Lumbosacral spondylosis without myelopathy    3. Cervical spondylosis without myelopathy    4. Lumbar radiculitis    5. Myofascial pain syndrome    Summary  Taylor Cat is a 47 y.o. female with chronic neck pain, back pain history of T12 fracture 2016, here for follow-up.  Chronic pain from lumbar DDD spondylosis with occasional right lower extremity radicular pain.  Chronic myofascial neck pain.  Repeat lumbar RFA as needed.     Right SI injection last visit reported 50% relief for a few weeks.  Continues to have generalized myofascial pain, low back pain and right sacroiliitis.  Reports marginal relief with meloxicam.  Tried gabapentin with marginal relief.    Will start Cymbalta.  Consider Lyrica and trying gabapentin again.    RTC 1 month.

## 2023-02-08 ENCOUNTER — TELEPHONE (OUTPATIENT)
Dept: PAIN MEDICINE | Facility: CLINIC | Age: 48
End: 2023-02-08
Payer: COMMERCIAL

## 2023-02-08 NOTE — TELEPHONE ENCOUNTER
Patient states her cymbalta is causing her migraines she is asking if there  Is there something else you want to prescribe her? She stop taking it already.

## 2023-02-14 RX ORDER — GABAPENTIN 300 MG/1
300 CAPSULE ORAL 3 TIMES DAILY
Qty: 90 CAPSULE | Refills: 1 | Status: SHIPPED | OUTPATIENT
Start: 2023-02-14

## 2023-02-26 DIAGNOSIS — G89.4 CHRONIC PAIN SYNDROME: ICD-10-CM

## 2023-02-27 RX ORDER — DULOXETIN HYDROCHLORIDE 30 MG/1
CAPSULE, DELAYED RELEASE ORAL
Qty: 60 CAPSULE | Refills: 0 | OUTPATIENT
Start: 2023-02-27

## 2023-03-02 ENCOUNTER — OFFICE VISIT (OUTPATIENT)
Dept: PAIN MEDICINE | Facility: CLINIC | Age: 48
End: 2023-03-02
Payer: COMMERCIAL

## 2023-03-02 VITALS
DIASTOLIC BLOOD PRESSURE: 60 MMHG | SYSTOLIC BLOOD PRESSURE: 132 MMHG | OXYGEN SATURATION: 96 % | HEART RATE: 82 BPM | RESPIRATION RATE: 16 BRPM

## 2023-03-02 DIAGNOSIS — M47.812 CERVICAL SPONDYLOSIS WITHOUT MYELOPATHY: ICD-10-CM

## 2023-03-02 DIAGNOSIS — M79.18 MYOFASCIAL PAIN SYNDROME: ICD-10-CM

## 2023-03-02 DIAGNOSIS — G89.4 CHRONIC PAIN SYNDROME: Primary | ICD-10-CM

## 2023-03-02 DIAGNOSIS — M46.1 SACROILIITIS: ICD-10-CM

## 2023-03-02 DIAGNOSIS — M47.817 LUMBOSACRAL SPONDYLOSIS WITHOUT MYELOPATHY: ICD-10-CM

## 2023-03-02 PROCEDURE — 99214 OFFICE O/P EST MOD 30 MIN: CPT | Performed by: ANESTHESIOLOGY

## 2023-03-02 RX ORDER — DULOXETIN HYDROCHLORIDE 60 MG/1
60 CAPSULE, DELAYED RELEASE ORAL DAILY
Qty: 30 CAPSULE | Refills: 5 | Status: SHIPPED | OUTPATIENT
Start: 2023-03-02

## 2023-03-02 NOTE — PROGRESS NOTES
Subjective    CC back pain, right hip pain  Taylor Cat is a 47 y.o. female with chronic neck pain, back pain history of T12 fracture 2016, here for follow-up.  Since last visit she has tried Cymbalta and tolerated well.  He originally thought it was giving her headaches but turns out she was just having a temporary migraine.  She is also having good relief of gabapentin at bedtime.  Denies any new issues today.  Chronic worsening bilateral hand neuropathic pain radiating to elbow.  Chronic back pain mostly axial occasionally radiating to bilateral hips and right lower extremity worse with standing walking or activity.  Denies injury, saddle anesthesia bladder bowel continence.  Chronic neck pain mostly myofascial mild relief with steroid pack and muscle relaxer.  Denies regular pain.    Back pain interfere with work despite accommodations/works at [employer].   Tried LESI in the past with good relief but was short-lived.  Tried muscle relaxers, anti-inflammatory, physical therapy with marginal relief.    Spine MRI 2021 facet degeneration primarily at L4-L5 of mild severity.  Negative for disc extrusion compression or fracture or subluxation.  L-spine x-ray 2017:  Stable remote fracture deformity with anterior wall wedge deformity of T12.  Otherwise mild degenerative changes.    Pain Assessment   Location of Pain: Lower Back, R Hip, L Hip, R Leg, neck pain, joint  Description of Pain: Dull/Aching, Throbbing, Stabbing  Previous Pain Rating :6  Current Pain Ratin  Aggravating Factors: Activity  Alleviating Factors: Rest, Medication    PEG Assessment   What number best describes your pain on average in the past week?4  What number best describes how, during the past week, pain has interfered with your enjoyment of life?2  What number best describes how, during the past week, pain has interfered with your general activity?  8    The following portions of the patient's history were reviewed and updated as  appropriate: allergies, current medications, past family history, past medical history, past social history, past surgical history and problem list.     has a past medical history of Anemia, Anxiety, Arthritis, Asthma, Chronic back pain, COVID-19 (), DDD, lumbar, Depression, Endometriosis, GERD (gastroesophageal reflux disease), Hemorrhoids, IBS, Insomnia, Low back pain, Lumbar radiculitis, MAMMO, Migraine, Myofasciitis, PAP, Spondylosis, T12 Vert Fx, and Thyroid disease.   has a past surgical history that includes Hysterectomy; d & c and laparoscopy; Cyst Removal; Subtotal Hysterectomy; and Colonoscopy.  family history includes Alcohol abuse in her father and sister; Anxiety disorder in her daughter, daughter, maternal grandfather, mother, and paternal grandmother; Arthritis in her mother and paternal grandmother; Cancer in her paternal grandmother; Dementia in her paternal grandmother; Depression in her father and paternal grandmother; Developmental Disability in her paternal uncle; Drug abuse in her sister; Heart disease in her father, maternal grandfather, mother, and paternal grandfather; Duplin's disease in her paternal grandmother; Hyperlipidemia in her mother and paternal grandfather; Hypertension in her paternal grandfather; Irritable bowel syndrome in her brother; Learning disabilities in her paternal uncle; Stroke in her father; Thyroid disease in her daughter, maternal grandfather, maternal grandmother, and mother.  Social History     Tobacco Use   • Smoking status: Former     Packs/day: 0.25     Years: 4.00     Pack years: 1.00     Types: Cigarettes     Quit date: 2010     Years since quittin.1   • Smokeless tobacco: Never   Substance Use Topics   • Alcohol use: No     Review of Systems   Musculoskeletal: Positive for arthralgias, back pain, myalgias and neck pain.   Neurological: Positive for headache.   All other systems reviewed and are negative.    Objective   Physical Exam  Vitals  reviewed.   Constitutional:       General: She is not in acute distress.  Pulmonary:      Effort: Pulmonary effort is normal.   Musculoskeletal:      Cervical back: Spasms present. Decreased range of motion.      Lumbar back: Spasms and tenderness present. Positive right straight leg raise test.      Comments: Lumbar loading positive, pain on extension of low back past 5 degrees.  TTP on the lumbar facets noted.  Right SI tenderness, positive Renée.       /60   Pulse 82   Resp 16   SpO2 96%     PHQ 9 on chart  Opioid risk tool low risk    Assessment & Plan   Diagnoses and all orders for this visit:    1. Chronic pain syndrome (Primary)  -     DULoxetine (CYMBALTA) 60 MG capsule; Take 1 capsule by mouth Daily.  Dispense: 30 capsule; Refill: 5    2. Lumbosacral spondylosis without myelopathy    3. Cervical spondylosis without myelopathy    4. Myofascial pain syndrome    5. Sacroiliitis (HCC)    Summary  Taylor Cat is a 47 y.o. female with chronic neck pain, back pain history of T12 fracture 2016, here for follow-up.  Chronic pain from lumbar DDD spondylosis with occasional right lower extremity radicular pain.  Chronic myofascial neck pain.  Repeat lumbar RFA as needed.     Since last visit she has tried Cymbalta and tolerated well.  He originally thought it was giving her headaches but turns out she was just having a temporary migraine.  She is also having good relief of gabapentin at bedtime.  Denies any new issues today.    We will continue Cymbalta 60 mg daily.  Continue gabapentin 300 mg 3 times daily as needed.  Mostly taking at bedtime    RTC 6 month.

## 2023-03-23 DIAGNOSIS — G89.4 CHRONIC PAIN SYNDROME: ICD-10-CM

## 2023-03-23 RX ORDER — DULOXETIN HYDROCHLORIDE 30 MG/1
CAPSULE, DELAYED RELEASE ORAL
Qty: 60 CAPSULE | Refills: 0 | OUTPATIENT
Start: 2023-03-23

## 2023-04-17 RX ORDER — GABAPENTIN 300 MG/1
CAPSULE ORAL
Qty: 90 CAPSULE | Refills: 5 | Status: SHIPPED | OUTPATIENT
Start: 2023-04-17

## 2023-04-18 DIAGNOSIS — G89.4 CHRONIC PAIN SYNDROME: ICD-10-CM

## 2023-04-18 RX ORDER — DULOXETIN HYDROCHLORIDE 30 MG/1
CAPSULE, DELAYED RELEASE ORAL
Qty: 60 CAPSULE | Refills: 0 | OUTPATIENT
Start: 2023-04-18

## 2023-04-19 DIAGNOSIS — G89.4 CHRONIC PAIN SYNDROME: ICD-10-CM

## 2023-04-19 RX ORDER — DULOXETIN HYDROCHLORIDE 60 MG/1
CAPSULE, DELAYED RELEASE ORAL
Qty: 30 CAPSULE | Refills: 5 | OUTPATIENT
Start: 2023-04-19

## 2023-04-25 ENCOUNTER — PATIENT MESSAGE (OUTPATIENT)
Dept: PAIN MEDICINE | Facility: CLINIC | Age: 48
End: 2023-04-25
Payer: COMMERCIAL

## 2023-04-26 NOTE — TELEPHONE ENCOUNTER
From: Taylor Cat  To: Marlene Garcia  Sent: 4/25/2023 5:17 PM EDT  Subject: Handicap pass     I was wanting to see if there is way to get a handicap pass to hang on mirror cause so hard to walk if have to park so far away

## 2023-05-01 NOTE — TELEPHONE ENCOUNTER
5/1/23-- Disablity form filled out and is ready for pt to  form---- tried to call pt-- her vm is full could not  Leave message ( hub  Can tell pt)

## 2023-06-06 ENCOUNTER — OFFICE VISIT (OUTPATIENT)
Dept: FAMILY MEDICINE CLINIC | Facility: CLINIC | Age: 48
End: 2023-06-06
Payer: COMMERCIAL

## 2023-06-06 VITALS
DIASTOLIC BLOOD PRESSURE: 84 MMHG | HEIGHT: 66 IN | HEART RATE: 100 BPM | OXYGEN SATURATION: 98 % | BODY MASS INDEX: 26.23 KG/M2 | TEMPERATURE: 98.2 F | SYSTOLIC BLOOD PRESSURE: 133 MMHG | WEIGHT: 163.2 LBS

## 2023-06-06 DIAGNOSIS — H66.90 ACUTE OTITIS MEDIA, UNSPECIFIED OTITIS MEDIA TYPE: Primary | ICD-10-CM

## 2023-06-06 DIAGNOSIS — H92.02 EAR PAIN, LEFT: ICD-10-CM

## 2023-06-06 DIAGNOSIS — B37.0 ORAL THRUSH: ICD-10-CM

## 2023-06-06 PROCEDURE — 99213 OFFICE O/P EST LOW 20 MIN: CPT | Performed by: NURSE PRACTITIONER

## 2023-06-06 RX ORDER — MONTELUKAST SODIUM 10 MG/1
10 TABLET ORAL NIGHTLY
Qty: 90 TABLET | Refills: 3 | Status: SHIPPED | OUTPATIENT
Start: 2023-06-06

## 2023-06-06 NOTE — ASSESSMENT & PLAN NOTE
Started this past Saturday. She reports her throat has been sore on Friday and Saturday. Denies any ear drainage, fever, or chills. She has not been around anyone sick that she knows of. She has a history of allergies- she takes over the counter allergy medication.     Prescribed singulair     Ear clear on exam

## 2023-06-06 NOTE — ASSESSMENT & PLAN NOTE
Started this past Saturday. She reports her throat has been sore on Friday and Saturday. Denies any ear drainage, fever, or chills. She has not been around anyone sick that she knows of. She has a history of allergies- she takes over the counter allergy medication.

## 2023-06-06 NOTE — PROGRESS NOTES
"Chief Complaint  Chief Complaint   Patient presents with    Earache     Left ear  Pt stated symptoms started Saturday; and now making her throat hurt as well, pt thinks may be allergies        Subjective          Taylor Cat is a 47-year-old female who presents to the office today with complaints of ear pain.    Left ear pain-Started this past Saturday. She reports her throat has been sore on Friday and Saturday. Denies any ear drainage, fever, or chills. She has not been around anyone sick that she knows of. She has a history of allergies- she takes over the counter allergy medication.        Review of Systems   Constitutional:  Negative for chills and fever.   HENT:  Positive for ear pain and sore throat. Negative for ear discharge.    Respiratory:  Negative for shortness of breath.    Cardiovascular:  Negative for chest pain.   Gastrointestinal:  Positive for constipation and GERD. Negative for abdominal pain, nausea and vomiting.   Genitourinary:  Negative for difficulty urinating.   Musculoskeletal:  Negative for arthralgias.   Skin: Negative.    Allergic/Immunologic: Positive for environmental allergies.   Neurological:  Positive for headache. Negative for dizziness and light-headedness.      Objective   Vital Signs:   Vitals:    06/06/23 1550   BP: 133/84   Pulse: 100   Temp: 98.2 °F (36.8 °C)   SpO2: 98%      Estimated body mass index is 26.35 kg/m² as calculated from the following:    Height as of this encounter: 167.6 cm (65.98\").    Weight as of this encounter: 74 kg (163 lb 3.2 oz).          Physical Exam  Vitals reviewed.   Constitutional:       Appearance: Normal appearance. She is normal weight.   HENT:      Head: Normocephalic and atraumatic.      Right Ear: Tympanic membrane normal.      Left Ear: Tympanic membrane normal.      Nose: Nose normal.      Mouth/Throat:      Pharynx: Oropharyngeal exudate present.   Eyes:      Extraocular Movements: Extraocular movements intact.      " Conjunctiva/sclera: Conjunctivae normal.   Cardiovascular:      Rate and Rhythm: Normal rate and regular rhythm.      Pulses: Normal pulses.      Heart sounds: Normal heart sounds.      Comments: S1, S2 audible  Pulmonary:      Effort: Pulmonary effort is normal.      Breath sounds: Normal breath sounds.      Comments: On room air   Abdominal:      General: Abdomen is flat.   Musculoskeletal:         General: Normal range of motion.      Cervical back: Normal range of motion.   Skin:     General: Skin is warm and dry.   Neurological:      General: No focal deficit present.      Mental Status: She is alert and oriented to person, place, and time. Mental status is at baseline.   Psychiatric:         Mood and Affect: Mood normal.         Behavior: Behavior normal.         Thought Content: Thought content normal.         Judgment: Judgment normal.              Physical Exam   Result Review :             Procedures       Assessment and Plan     Diagnoses and all orders for this visit:    1. Acute otitis media, unspecified otitis media type (Primary)    2. Oral thrush  Assessment & Plan:  Noted on exam   Nystatin prescribed       3. Ear pain, left  Assessment & Plan:  Started this past Saturday. She reports her throat has been sore on Friday and Saturday. Denies any ear drainage, fever, or chills. She has not been around anyone sick that she knows of. She has a history of allergies- she takes over the counter allergy medication.     Prescribed singulair     Ear clear on exam       Other orders  -     nystatin (MYCOSTATIN) 100,000 unit/mL suspension; Swish and swallow 5 mL 4 (Four) Times a Day.  Dispense: 473 mL; Refill: 0  -     montelukast (Singulair) 10 MG tablet; Take 1 tablet by mouth Every Night.  Dispense: 90 tablet; Refill: 3              Follow Up   Return if symptoms worsen or fail to improve.   Patient was given instructions and counseling regarding her condition or for health maintenance advice. Please see  specific information pulled into the AVS if appropriate.

## 2023-06-07 ENCOUNTER — TELEPHONE (OUTPATIENT)
Dept: FAMILY MEDICINE CLINIC | Facility: CLINIC | Age: 48
End: 2023-06-07

## 2023-06-07 NOTE — TELEPHONE ENCOUNTER
HUB TO READ    I LEFT A VOICEMAIL FOR THE PT TO SCHEDULE AN APPOINTMENT WITH DR. MEHTA PER DR. MEHTA TO GO OVER FMLA PAPERWORK THE PT LEFT.

## 2023-06-12 RX ORDER — LEVOTHYROXINE SODIUM 0.05 MG/1
TABLET ORAL
Qty: 90 TABLET | Refills: 2 | Status: SHIPPED | OUTPATIENT
Start: 2023-06-12

## 2023-07-24 RX ORDER — ALBUTEROL SULFATE 90 UG/1
AEROSOL, METERED RESPIRATORY (INHALATION)
Qty: 18 G | Refills: 0 | Status: SHIPPED | OUTPATIENT
Start: 2023-07-24

## 2023-07-26 ENCOUNTER — OFFICE (OUTPATIENT)
Dept: URBAN - METROPOLITAN AREA CLINIC 64 | Facility: CLINIC | Age: 48
End: 2023-07-26

## 2023-07-26 VITALS
HEIGHT: 65 IN | HEART RATE: 68 BPM | DIASTOLIC BLOOD PRESSURE: 98 MMHG | WEIGHT: 164 LBS | SYSTOLIC BLOOD PRESSURE: 140 MMHG

## 2023-07-26 DIAGNOSIS — R13.10 DYSPHAGIA, UNSPECIFIED: ICD-10-CM

## 2023-07-26 DIAGNOSIS — K59.00 CONSTIPATION, UNSPECIFIED: ICD-10-CM

## 2023-07-26 PROCEDURE — 99204 OFFICE O/P NEW MOD 45 MIN: CPT | Performed by: INTERNAL MEDICINE

## 2023-08-31 ENCOUNTER — OFFICE VISIT (OUTPATIENT)
Dept: PAIN MEDICINE | Facility: CLINIC | Age: 48
End: 2023-08-31
Payer: COMMERCIAL

## 2023-08-31 VITALS
HEART RATE: 80 BPM | DIASTOLIC BLOOD PRESSURE: 85 MMHG | OXYGEN SATURATION: 97 % | SYSTOLIC BLOOD PRESSURE: 119 MMHG | RESPIRATION RATE: 16 BRPM

## 2023-08-31 DIAGNOSIS — M46.1 SACROILIITIS: ICD-10-CM

## 2023-08-31 DIAGNOSIS — G89.4 CHRONIC PAIN SYNDROME: Primary | ICD-10-CM

## 2023-08-31 DIAGNOSIS — M47.812 CERVICAL SPONDYLOSIS WITHOUT MYELOPATHY: ICD-10-CM

## 2023-08-31 DIAGNOSIS — M79.18 MYOFASCIAL PAIN SYNDROME: ICD-10-CM

## 2023-08-31 DIAGNOSIS — M47.817 LUMBOSACRAL SPONDYLOSIS WITHOUT MYELOPATHY: ICD-10-CM

## 2023-08-31 RX ORDER — GABAPENTIN 600 MG/1
600 TABLET ORAL 3 TIMES DAILY
Qty: 90 TABLET | Refills: 5 | Status: SHIPPED | OUTPATIENT
Start: 2023-08-31

## 2023-08-31 RX ORDER — DULOXETIN HYDROCHLORIDE 60 MG/1
60 CAPSULE, DELAYED RELEASE ORAL DAILY
Qty: 90 CAPSULE | Refills: 1 | Status: SHIPPED | OUTPATIENT
Start: 2023-08-31

## 2023-09-01 NOTE — PROGRESS NOTES
Subjective    CC back pain, right hip pain  Taylor Cat is a 47 y.o. female with chronic neck pain, back pain history of T12 fracture 2016, here for follow-up.  Denies any recent changes or new issues.  Continues to have mild relief with gabapentin and Cymbalta.  She denies any side effects.  Reports poor sleep however just not falling asleep.  Chronic bilateral hand neuropathic pain radiating to elbow.  Chronic back pain mostly axial occasionally radiating to bilateral hips and right lower extremity worse with standing walking or activity.  Denies injury, saddle anesthesia bladder bowel continence.  Chronic neck pain mostly myofascial mild relief with steroid pack and muscle relaxer.  Denies regular pain.    Back pain interfere with work despite accommodations/works at [employer].   Tried LESI in the past with good relief but was short-lived.  Tried muscle relaxers, anti-inflammatory, physical therapy with marginal relief.    Spine MRI 2021 facet degeneration primarily at L4-L5 of mild severity.  Negative for disc extrusion compression or fracture or subluxation.  L-spine x-ray 2017:  Stable remote fracture deformity with anterior wall wedge deformity of T12.  Otherwise mild degenerative changes.    Pain Assessment   Location of Pain: Lower Back, R Hip, L Hip, R Leg, neck pain, joint  Description of Pain: Dull/Aching, Throbbing, Stabbing  Previous Pain Rating :6  Current Pain Ratin  Aggravating Factors: Activity  Alleviating Factors: Rest, Medication    PEG Assessment   What number best describes your pain on average in the past week?4  What number best describes how, during the past week, pain has interfered with your enjoyment of life?4  What number best describes how, during the past week, pain has interfered with your general activity?  9    The following portions of the patient's history were reviewed and updated as appropriate: allergies, current medications, past family history, past medical history,  past social history, past surgical history and problem list.     has a past medical history of Anemia, Anxiety, Arthritis, Asthma, Chronic back pain, COVID-19 (), DDD, lumbar, Depression, Endometriosis, GERD (gastroesophageal reflux disease), Hemorrhoids, Hip pain, acute, left, IBS, Insomnia, Low back pain, Lumbar radiculitis, MAMMO, Migraine, Myofasciitis, PAP, Spondylosis, T12 Vert Fx, and Thyroid disease.   has a past surgical history that includes Hysterectomy; d & c and laparoscopy; Cyst Removal; Subtotal Hysterectomy; and Colonoscopy.  family history includes Alcohol abuse in her father and sister; Anxiety disorder in her daughter, daughter, maternal grandfather, mother, and paternal grandmother; Arthritis in her mother and paternal grandmother; Cancer in her paternal grandmother; Dementia in her paternal grandmother; Depression in her father and paternal grandmother; Developmental Disability in her paternal uncle; Drug abuse in her sister; Heart disease in her father, maternal grandfather, mother, and paternal grandfather; Jyoti's disease in her paternal grandmother; Hyperlipidemia in her mother and paternal grandfather; Hypertension in her paternal grandfather; Irritable bowel syndrome in her brother; Learning disabilities in her paternal uncle; Stroke in her father; Thyroid disease in her daughter, maternal grandfather, maternal grandmother, and mother.  Social History     Tobacco Use    Smoking status: Former     Packs/day: 0.25     Years: 4.00     Pack years: 1.00     Types: Cigarettes     Quit date: 2010     Years since quittin.6     Passive exposure: Past    Smokeless tobacco: Never   Substance Use Topics    Alcohol use: No     Review of Systems   Musculoskeletal:  Positive for arthralgias, back pain, myalgias and neck pain.   Neurological:  Positive for headache.   All other systems reviewed and are negative.  Objective   Physical Exam  Vitals reviewed.   Constitutional:        General: She is not in acute distress.  Pulmonary:      Effort: Pulmonary effort is normal.   Musculoskeletal:      Cervical back: Spasms present. Decreased range of motion.      Lumbar back: Spasms and tenderness present. Positive right straight leg raise test.      Comments: Lumbar loading positive, pain on extension of low back past 5 degrees.  TTP on the lumbar facets noted.  Right SI tenderness, positive Renée.     /85   Pulse 80   Resp 16   SpO2 97%     PHQ 9 on chart  Opioid risk tool low risk    Assessment & Plan   Diagnoses and all orders for this visit:    1. Chronic pain syndrome (Primary)  -     gabapentin (NEURONTIN) 600 MG tablet; Take 1 tablet by mouth 3 (Three) Times a Day.  Dispense: 90 tablet; Refill: 5  -     DULoxetine (CYMBALTA) 60 MG capsule; Take 1 capsule by mouth Daily.  Dispense: 90 capsule; Refill: 1    2. Lumbosacral spondylosis without myelopathy    3. Cervical spondylosis without myelopathy    4. Sacroiliitis    5. Myofascial pain syndrome    Summary  Taylor Cat is a 47 y.o. female with chronic neck pain, back pain history of T12 fracture 2016, here for follow-up.  Chronic pain from lumbar DDD spondylosis with occasional right lower extremity radicular pain.  Chronic myofascial neck pain.  Repeat lumbar RFA as needed.     Denies any recent changes or new issues.  Continues to have mild relief with gabapentin and Cymbalta.  She denies any side effects.  Reports poor sleep however just not falling asleep  Continued bilateral hand neuropathic pain.  Increase gabapentin to 600 mg 3 times daily.    We will continue Cymbalta 60 mg daily.  Continue gabapentin .  Mostly taking at bedtime    RTC 6 month.

## 2023-09-25 RX ORDER — OMEPRAZOLE 20 MG/1
CAPSULE, DELAYED RELEASE ORAL
Qty: 90 CAPSULE | Refills: 0 | Status: SHIPPED | OUTPATIENT
Start: 2023-09-25

## 2023-10-20 ENCOUNTER — ON CAMPUS - OUTPATIENT (OUTPATIENT)
Dept: URBAN - METROPOLITAN AREA HOSPITAL 77 | Facility: HOSPITAL | Age: 48
End: 2023-10-20

## 2023-10-20 DIAGNOSIS — K22.2 ESOPHAGEAL OBSTRUCTION: ICD-10-CM

## 2023-10-20 DIAGNOSIS — R10.13 EPIGASTRIC PAIN: ICD-10-CM

## 2023-10-20 PROCEDURE — 43450 DILATE ESOPHAGUS 1/MULT PASS: CPT | Performed by: INTERNAL MEDICINE

## 2023-10-20 PROCEDURE — 43235 EGD DIAGNOSTIC BRUSH WASH: CPT | Performed by: INTERNAL MEDICINE

## 2023-12-18 RX ORDER — OMEPRAZOLE 20 MG/1
CAPSULE, DELAYED RELEASE ORAL
Qty: 90 CAPSULE | Refills: 0 | Status: SHIPPED | OUTPATIENT
Start: 2023-12-18

## 2024-01-19 DIAGNOSIS — E03.9 ACQUIRED HYPOTHYROIDISM: Primary | ICD-10-CM

## 2024-01-19 DIAGNOSIS — E78.2 MIXED HYPERLIPIDEMIA: ICD-10-CM

## 2024-01-19 RX ORDER — LEVOTHYROXINE SODIUM 0.05 MG/1
TABLET ORAL
Qty: 90 TABLET | Refills: 2 | OUTPATIENT
Start: 2024-01-19

## 2024-01-19 NOTE — TELEPHONE ENCOUNTER
Relay    Overdue for fasting labs and 6mos f/u appt with Dr. Freire per Dr. Freire.    Left ms for patient to call back to schedule.

## 2024-01-19 NOTE — TELEPHONE ENCOUNTER
Rx Refill Note  Requested Prescriptions     Pending Prescriptions Disp Refills    levothyroxine (SYNTHROID, LEVOTHROID) 50 MCG tablet [Pharmacy Med Name: LEVOTHYROXINE 50 MCG TABLET] 90 tablet 2     Sig: TAKE 1 TABLET BY MOUTH EVERY DAY      Last office visit with prescribing clinician: 6/27/2023   Last telemedicine visit with prescribing clinician: Visit date not found   Next office visit with prescribing clinician: Visit date not found        Lipid Panel (05/27/2022 10:38)                  Would you like a call back once the refill request has been completed: [] Yes [] No    If the office needs to give you a call back, can they leave a voicemail: [] Yes [] No    June Rucker, RT  01/19/24, 08:51 EST

## 2024-02-09 RX ORDER — GABAPENTIN 300 MG/1
CAPSULE ORAL
Qty: 90 CAPSULE | Refills: 5 | OUTPATIENT
Start: 2024-02-09

## 2024-03-18 RX ORDER — OMEPRAZOLE 20 MG/1
CAPSULE, DELAYED RELEASE ORAL
Qty: 90 CAPSULE | Refills: 0 | Status: SHIPPED | OUTPATIENT
Start: 2024-03-18

## 2024-03-18 NOTE — TELEPHONE ENCOUNTER
Rx Refill Note  Requested Prescriptions     Pending Prescriptions Disp Refills    omeprazole (priLOSEC) 20 MG capsule [Pharmacy Med Name: OMEPRAZOLE DR 20 MG CAPSULE] 90 capsule 0     Sig: TAKE 1 CAPSULE BY MOUTH EVERY DAY      Last office visit with prescribing clinician: 6/27/2023   Last telemedicine visit with prescribing clinician: Visit date not found   Next office visit with prescribing clinician: Visit date not found     Comprehensive Metabolic Panel (05/27/2022 10:38)   Lipid Panel (05/27/2022 10:38)                     Would you like a call back once the refill request has been completed: [] Yes [] No    If the office needs to give you a call back, can they leave a voicemail: [] Yes [] No    Zoe Harper MA  03/18/24, 10:47 EDT

## 2024-04-09 NOTE — TELEPHONE ENCOUNTER
Rx Refill Note  Requested Prescriptions     Pending Prescriptions Disp Refills    levothyroxine (SYNTHROID, LEVOTHROID) 50 MCG tablet [Pharmacy Med Name: LEVOTHYROXINE 50 MCG TABLET] 90 tablet 2     Sig: TAKE 1 TABLET BY MOUTH EVERY DAY      Last office visit with prescribing clinician: 6/27/2023   Last telemedicine visit with prescribing clinician: Visit date not found   Next office visit with prescribing clinician: Visit date not found        TSH (09/16/2022 15:17)                  Would you like a call back once the refill request has been completed: [] Yes [] No    If the office needs to give you a call back, can they leave a voicemail: [] Yes [] No    June Rucker, RT  04/09/24, 13:35 EDT

## 2024-04-10 RX ORDER — LEVOTHYROXINE SODIUM 0.05 MG/1
TABLET ORAL
Qty: 30 TABLET | Refills: 0 | Status: SHIPPED | OUTPATIENT
Start: 2024-04-10

## 2024-05-07 RX ORDER — LEVOTHYROXINE SODIUM 0.05 MG/1
TABLET ORAL
Qty: 30 TABLET | Refills: 0 | OUTPATIENT
Start: 2024-05-07

## 2024-05-16 ENCOUNTER — OFFICE VISIT (OUTPATIENT)
Dept: FAMILY MEDICINE CLINIC | Facility: CLINIC | Age: 49
End: 2024-05-16
Payer: COMMERCIAL

## 2024-05-16 VITALS
DIASTOLIC BLOOD PRESSURE: 74 MMHG | SYSTOLIC BLOOD PRESSURE: 111 MMHG | OXYGEN SATURATION: 97 % | BODY MASS INDEX: 26.36 KG/M2 | HEART RATE: 74 BPM | HEIGHT: 66 IN | RESPIRATION RATE: 14 BRPM | WEIGHT: 164 LBS | TEMPERATURE: 97.7 F

## 2024-05-16 DIAGNOSIS — G89.4 CHRONIC PAIN SYNDROME: ICD-10-CM

## 2024-05-16 DIAGNOSIS — J45.20 MILD INTERMITTENT ASTHMA WITHOUT COMPLICATION: ICD-10-CM

## 2024-05-16 DIAGNOSIS — R00.2 PALPITATIONS: Primary | ICD-10-CM

## 2024-05-16 DIAGNOSIS — E78.2 MIXED HYPERLIPIDEMIA: ICD-10-CM

## 2024-05-16 DIAGNOSIS — E03.9 ACQUIRED HYPOTHYROIDISM: Primary | ICD-10-CM

## 2024-05-16 DIAGNOSIS — Z86.69 HISTORY OF MIGRAINE HEADACHES: ICD-10-CM

## 2024-05-16 DIAGNOSIS — R00.2 INTERMITTENT PALPITATIONS: ICD-10-CM

## 2024-05-16 PROCEDURE — 84443 ASSAY THYROID STIM HORMONE: CPT | Performed by: FAMILY MEDICINE

## 2024-05-16 PROCEDURE — 80053 COMPREHEN METABOLIC PANEL: CPT | Performed by: FAMILY MEDICINE

## 2024-05-16 PROCEDURE — 99214 OFFICE O/P EST MOD 30 MIN: CPT | Performed by: FAMILY MEDICINE

## 2024-05-16 PROCEDURE — 84439 ASSAY OF FREE THYROXINE: CPT | Performed by: FAMILY MEDICINE

## 2024-05-16 PROCEDURE — 80061 LIPID PANEL: CPT | Performed by: FAMILY MEDICINE

## 2024-05-16 RX ORDER — DULOXETIN HYDROCHLORIDE 60 MG/1
60 CAPSULE, DELAYED RELEASE ORAL DAILY
Qty: 90 CAPSULE | Refills: 1 | Status: SHIPPED | OUTPATIENT
Start: 2024-05-16

## 2024-05-16 RX ORDER — ATOGEPANT 60 MG/1
60 TABLET ORAL DAILY
Qty: 14 TABLET | Refills: 0 | COMMUNITY
Start: 2024-05-16

## 2024-05-16 RX ORDER — GABAPENTIN 800 MG/1
800 TABLET ORAL NIGHTLY
Qty: 30 TABLET | Refills: 0 | Status: SHIPPED | OUTPATIENT
Start: 2024-05-16

## 2024-05-16 RX ORDER — ALBUTEROL SULFATE 90 UG/1
2 AEROSOL, METERED RESPIRATORY (INHALATION) EVERY 6 HOURS PRN
Qty: 18 G | Refills: 0 | Status: SHIPPED | OUTPATIENT
Start: 2024-05-16

## 2024-05-16 RX ORDER — BUSPIRONE HYDROCHLORIDE 5 MG/1
TABLET ORAL
Qty: 30 TABLET | Refills: 0 | Status: SHIPPED | OUTPATIENT
Start: 2024-05-16

## 2024-05-16 RX ORDER — LEVOTHYROXINE SODIUM 0.05 MG/1
50 TABLET ORAL DAILY
Qty: 90 TABLET | Refills: 0 | Status: SHIPPED | OUTPATIENT
Start: 2024-05-16 | End: 2024-05-17 | Stop reason: SDUPTHER

## 2024-05-16 RX ORDER — MONTELUKAST SODIUM 10 MG/1
10 TABLET ORAL NIGHTLY
Qty: 90 TABLET | Refills: 3 | Status: SHIPPED | OUTPATIENT
Start: 2024-05-16

## 2024-05-16 RX ORDER — OMEPRAZOLE 20 MG/1
20 CAPSULE, DELAYED RELEASE ORAL DAILY PRN
Qty: 90 CAPSULE | Refills: 0 | Status: SHIPPED | OUTPATIENT
Start: 2024-05-16

## 2024-05-16 NOTE — PROGRESS NOTES
Venipuncture performed in RIGHT ARM with good hemostasis. Patient tolerated well. Zoe ALVA MA

## 2024-05-16 NOTE — PROGRESS NOTES
Answers submitted by the patient for this visit:  Primary Reason for Visit (Submitted on 5/16/2024)  What is the primary reason for your visit?: Anxiety  Anxiety (Submitted on 5/16/2024)  Chief Complaint: Anxiety  Visit: follow-up  Frequency: most days  Severity: severe  chest pain: No  confusion: No  depressed mood: Yes  dizziness: No  dry mouth: No  excessive worry: Yes  insomnia: Yes  irritability: Yes  malaise/fatigue: Yes  nausea: No  obsessions: No  palpitations: Yes  shortness of breath: No  Sleep quality: poor  Hours of sleep per night: 4 Hours  Aggravated by: family issues, social activities  Medication compliance: %  Subjective   Taylor Cat is a 48 y.o. female.     Chief Complaint   Patient presents with    Foot Swelling    Anxiety     Medication doesn't seem to be working as good.          Current Outpatient Medications:     albuterol sulfate  (90 Base) MCG/ACT inhaler, Inhale 2 puffs Every 6 (Six) Hours As Needed for Wheezing., Disp: 18 g, Rfl: 0    aspirin-acetaminophen-caffeine (EXCEDRIN MIGRAINE) 250-250-65 MG per tablet, Take 1 tablet by mouth Every 6 (Six) Hours As Needed for Headache., Disp: , Rfl:     DULoxetine (CYMBALTA) 60 MG capsule, Take 1 capsule by mouth Daily., Disp: 90 capsule, Rfl: 1    fexofenadine (ALLEGRA) 180 MG tablet, Take 1 tablet by mouth Daily., Disp: , Rfl:     gabapentin (NEURONTIN) 800 MG tablet, Take 1 tablet by mouth Every Night., Disp: 30 tablet, Rfl: 0    methocarbamol (ROBAXIN) 500 MG tablet, Take 1 tablet by mouth 3 (Three) Times a Day., Disp: 60 tablet, Rfl: 2    montelukast (Singulair) 10 MG tablet, Take 1 tablet by mouth Every Night., Disp: 90 tablet, Rfl: 3    omeprazole (priLOSEC) 20 MG capsule, Take 1 capsule by mouth Daily As Needed (GERD)., Disp: 90 capsule, Rfl: 0    ondansetron ODT (ZOFRAN-ODT) 4 MG disintegrating tablet, PLACE 1 TABLET ON THE TONGUE EVERY 8 HOURS AS NEEDED FOR NAUSEA AND VOMITING, Disp: 30 tablet, Rfl: 0    rizatriptan MLT  (MAXALT-MLT) 10 MG disintegrating tablet, TAKE 1 TABLET BY MOUTH AS NEEDED FOR MIGRAINES, MAY REPEAT EVERY 2 HOURS, MAX 3 A DAY, Disp: 6 tablet, Rfl: 3    Atogepant (Qulipta) 60 MG tablet, Take 1 tablet by mouth Daily., Disp: 14 tablet, Rfl: 0    busPIRone (BUSPAR) 5 MG tablet, 1/2 tab - 1 po tid prn anxiety and may increase by 5mg q3days prn anxiety to max of 10mg TID, Disp: 30 tablet, Rfl: 0    Emgality 120 MG/ML auto-injector pen, Every 30 (Thirty) Days., Disp: , Rfl:     levothyroxine (SYNTHROID, LEVOTHROID) 50 MCG tablet, Take 1 tablet by mouth Daily., Disp: 90 tablet, Rfl: 0    Past Medical History:   Diagnosis Date    Anemia     Anxiety     Arthritis     Asthma     Chronic back pain     COVID-19 2022    DDD, lumbar     Depression     Endometriosis     GERD     Hemorrhoids     Hip pain, acute, left     IBS     Insomnia     Low back pain     DDD w/ Radiulitis    Lumbar radiculitis     MAMMO     NEG = 2016/ 2020/ 2022   Dr. Crooks    Migraine     Myofasciitis     PAP     NEG = 2019    Spondylosis     T12 Vert Fx     T12 from trauma    Thyroid disease        Past Surgical History:   Procedure Laterality Date    COLONOSCOPY      COLOGUARD-----NEG = 2022, rech 2025    CYST REMOVAL      Neck cyst sx as child    D & C AND LAPAROSCOPY      HYSTERECTOMY      SUBTOTAL HYSTERECTOMY         Family History   Problem Relation Age of Onset    Arthritis Mother     Heart disease Mother     Thyroid disease Mother     Anxiety disorder Mother     Hyperlipidemia Mother     Stroke Father     Heart disease Father         Deseased    Alcohol abuse Father         Deseased 2018    Depression Father     Alcohol abuse Sister     Drug abuse Sister     Irritable bowel syndrome Brother     Thyroid disease Maternal Grandmother     Thyroid disease Maternal Grandfather     Anxiety disorder Maternal Grandfather         Deseased    Heart disease Maternal Grandfather         Deseased    Anxiety disorder Paternal Grandmother         Deseased     Arthritis Paternal Grandmother     Cancer Paternal Grandmother         Skin Cancer/ ?Cervical Cancer    Depression Paternal Grandmother     Todd's disease Paternal Grandmother     Dementia Paternal Grandmother     Heart disease Paternal Grandfather         Deseased    Hyperlipidemia Paternal Grandfather     Hypertension Paternal Grandfather     Anxiety disorder Daughter     Thyroid disease Daughter         They watching hers shes had meds off and on    Anxiety disorder Daughter     Developmental Disability Paternal Uncle         Ann cruz    Learning disabilities Paternal Uncle        Social History     Socioeconomic History    Marital status:    Tobacco Use    Smoking status: Former     Current packs/day: 0.00     Average packs/day: 0.3 packs/day for 4.0 years (1.0 ttl pk-yrs)     Types: Cigarettes     Start date: 2006     Quit date: 2010     Years since quittin.3     Passive exposure: Past    Smokeless tobacco: Never   Vaping Use    Vaping status: Former   Substance and Sexual Activity    Alcohol use: No    Drug use: No    Sexual activity: Defer       History of Present Illness  The patient is a 48-year-old female who presents for evaluation of multiple medical concerns, anxiety, and feet swelling.    The patient has been off her thyroid medication for approximately one week due to a lapse in obtaining the medication from the pharmacy.    The patient has been prescribed Emgality injections, however, her insurance has not approved this treatment, necessitating prior authorization at the start of the year. She has not received an Emgality injection in nearly three months. She has not experienced any side effects from the injection.     Her pain management specialist prescribes ibuprofen 800 mg, Robaxin, and Singulair. She has previously undergone three series of epidural injections and nerve ablation, both of which proved ineffective.    The patient suffers from intermittent heartburn  but is not currently on any medication for this condition. She consumes Pepsi and has attempted to quit, but experiences migraines. She does not require Zofran.    The patient continues to experience anxiety. Her pain management specialist prescribes Cymbalta.    The patient experiences heart palpitations, facial discomfort, mouth twitching, and a change in her voice during these episodes. She denies experiencing shortness of breath, sweating, pain, nausea, or vomiting.    The patient is experiencing significant swelling in her feet. Her occupation involves prolonged sitting.  She is uncertain about the use of compression socks due to their tightness on her legs. She is currently prescribed gabapentin 600 mg once at night for her back pain, which provides intermittent relief.    Supplemental Information  She has a rescue inhaler for her asthma.    She does not smoke.   Her father had heart surgery for extra valve at the age of 17. She has a family history of heart attacks.     She is allergic to LATEX, MORPHINE, SHELLFISH, and ELAVIL.        The following portions of the patient's history were reviewed and updated as appropriate: allergies, current medications, past family history, past medical history, past social history, past surgical history and problem list.    Review of Systems   Constitutional:  Negative for activity change, appetite change, unexpected weight gain and unexpected weight loss.   Respiratory:  Negative for cough, shortness of breath and wheezing.    Cardiovascular:  Positive for palpitations and leg swelling. Negative for chest pain.   Gastrointestinal:  Positive for GERD. Negative for nausea.   Neurological:  Positive for headache. Negative for dizziness and confusion.   Psychiatric/Behavioral:  Positive for depressed mood. The patient is nervous/anxious.        Vitals:    05/16/24 1109   BP: 111/74   Pulse: 74   Resp: 14   Temp: 97.7 °F (36.5 °C)   SpO2: 97%       Objective   Physical  Exam  Vitals and nursing note reviewed.   Constitutional:       General: She is not in acute distress.     Appearance: She is well-developed. She is not ill-appearing or toxic-appearing.   HENT:      Head: Normocephalic and atraumatic.   Cardiovascular:      Rate and Rhythm: Normal rate and regular rhythm.      Heart sounds: Normal heart sounds. No murmur heard.  Pulmonary:      Effort: Pulmonary effort is normal. No respiratory distress.      Breath sounds: Normal breath sounds. No wheezing, rhonchi or rales.   Skin:     General: Skin is warm and dry.      Findings: No rash.   Neurological:      Mental Status: She is alert and oriented to person, place, and time.      Cranial Nerves: No cranial nerve deficit.   Psychiatric:         Attention and Perception: Attention and perception normal.         Mood and Affect: Mood and affect normal.         Speech: Speech normal.         Behavior: Behavior normal. Behavior is cooperative.         Thought Content: Thought content normal.         Cognition and Memory: Cognition and memory normal.         Judgment: Judgment normal.       Physical Exam       BMI is >= 25 and <30. (Overweight) The following options were offered after discussion;: exercise counseling/recommendations and nutrition counseling/recommendations      Assessment & Plan   Diagnoses and all orders for this visit:    1. Acquired hypothyroidism (Primary)  -     TSH  -     T4, Free    2. Intermittent palpitations    3. Mild intermittent asthma without complication    4. History of migraine headaches    5. Chronic pain syndrome  -     gabapentin (NEURONTIN) 800 MG tablet; Take 1 tablet by mouth Every Night.  Dispense: 30 tablet; Refill: 0  -     DULoxetine (CYMBALTA) 60 MG capsule; Take 1 capsule by mouth Daily.  Dispense: 90 capsule; Refill: 1    6. Mixed hyperlipidemia  -     Lipid Panel  -     Comprehensive Metabolic Panel    Other orders  -     Atogepant (Qulipta) 60 MG tablet; Take 1 tablet by mouth Daily.   Dispense: 14 tablet; Refill: 0  -     albuterol sulfate  (90 Base) MCG/ACT inhaler; Inhale 2 puffs Every 6 (Six) Hours As Needed for Wheezing.  Dispense: 18 g; Refill: 0  -     Discontinue: levothyroxine (SYNTHROID, LEVOTHROID) 50 MCG tablet; Take 1 tablet by mouth Daily.  Dispense: 90 tablet; Refill: 0  -     montelukast (Singulair) 10 MG tablet; Take 1 tablet by mouth Every Night.  Dispense: 90 tablet; Refill: 3  -     omeprazole (priLOSEC) 20 MG capsule; Take 1 capsule by mouth Daily As Needed (GERD).  Dispense: 90 capsule; Refill: 0  -     Cancel: Adult Transthoracic Echo Complete w/ Color, Spectral and Contrast if necessary per protocol; Future  -     busPIRone (BUSPAR) 5 MG tablet; 1/2 tab - 1 po tid prn anxiety and may increase by 5mg q3days prn anxiety to max of 10mg TID  Dispense: 30 tablet; Refill: 0      Assessment & Plan  1. Hypothyroidism.  A three-month supply of thyroid medication has been prescribed. Should there be a significant discrepancy in her thyroid levels, a re-evaluation will be scheduled in a few months.    2. Migraine.  A prescription for Qulipta has been issued, with instructions for the patient to commence the medication within the first one to two weeks.    3. Back pain.  A prescription for 30 tablets of gabapentin 800 mg has been issued.    4. Asthma.    5. Heartburn.  The patient has been advised to avoid caffeine, greasy, spicy foods, tomatoes, orange, yara, and high acid fruits. A prescription for daily heartburn medication has been issued.    6. Anxiety.  The patient has been advised to discuss with her pain management specialist about the potential addition of BuSpar to her regimen.    7. Lower extremity edema.  The edema could be attributed to weather changes or age-related changes. The patient has been advised to try compression hose.   A 2D echocardiogram has been disc'd. If the echocardiogram results are normal, an event monitor will be ordered.     Results             Patient or patient representative verbalized consent for the use of Ambient Listening during the visit with  Elise Freire DO for chart documentation. 5/22/2024  11:36 EDT

## 2024-05-17 ENCOUNTER — TELEPHONE (OUTPATIENT)
Dept: FAMILY MEDICINE CLINIC | Facility: CLINIC | Age: 49
End: 2024-05-17
Payer: COMMERCIAL

## 2024-05-17 DIAGNOSIS — R74.8 ELEVATED LIVER ENZYMES: ICD-10-CM

## 2024-05-17 DIAGNOSIS — E03.9 ACQUIRED HYPOTHYROIDISM: Primary | ICD-10-CM

## 2024-05-17 LAB
ALBUMIN SERPL-MCNC: 4.1 G/DL (ref 3.5–5.2)
ALBUMIN/GLOB SERPL: 1.6 G/DL
ALP SERPL-CCNC: 124 U/L (ref 39–117)
ALT SERPL W P-5'-P-CCNC: 46 U/L (ref 1–33)
ANION GAP SERPL CALCULATED.3IONS-SCNC: 7.9 MMOL/L (ref 5–15)
AST SERPL-CCNC: 45 U/L (ref 1–32)
BILIRUB SERPL-MCNC: 0.3 MG/DL (ref 0–1.2)
BUN SERPL-MCNC: 12 MG/DL (ref 6–20)
BUN/CREAT SERPL: 15.2 (ref 7–25)
CALCIUM SPEC-SCNC: 9.7 MG/DL (ref 8.6–10.5)
CHLORIDE SERPL-SCNC: 108 MMOL/L (ref 98–107)
CHOLEST SERPL-MCNC: 218 MG/DL (ref 0–200)
CO2 SERPL-SCNC: 27.1 MMOL/L (ref 22–29)
CREAT SERPL-MCNC: 0.79 MG/DL (ref 0.57–1)
EGFRCR SERPLBLD CKD-EPI 2021: 92.4 ML/MIN/1.73
GLOBULIN UR ELPH-MCNC: 2.5 GM/DL
GLUCOSE SERPL-MCNC: 91 MG/DL (ref 65–99)
HDLC SERPL-MCNC: 36 MG/DL (ref 40–60)
LDLC SERPL CALC-MCNC: 139 MG/DL (ref 0–100)
LDLC/HDLC SERPL: 3.73 {RATIO}
POTASSIUM SERPL-SCNC: 5.1 MMOL/L (ref 3.5–5.2)
PROT SERPL-MCNC: 6.6 G/DL (ref 6–8.5)
SODIUM SERPL-SCNC: 143 MMOL/L (ref 136–145)
T4 FREE SERPL-MCNC: 0.93 NG/DL (ref 0.93–1.7)
TRIGL SERPL-MCNC: 239 MG/DL (ref 0–150)
TSH SERPL DL<=0.05 MIU/L-ACNC: 10.2 UIU/ML (ref 0.27–4.2)
VLDLC SERPL-MCNC: 43 MG/DL (ref 5–40)

## 2024-05-17 RX ORDER — LEVOTHYROXINE SODIUM 0.05 MG/1
50 TABLET ORAL DAILY
Qty: 90 TABLET | Refills: 0 | Status: SHIPPED | OUTPATIENT
Start: 2024-05-17

## 2024-05-17 NOTE — TELEPHONE ENCOUNTER
HUB to relay   My chart message was sent to the patient     Per :    Thyroid off (TSH but Free T4 ok)---stay on same dose for now   LIPIDS high----work on lean meats in diet and exercise   CMP-----sugar and kidneys good but liver enzymes some elevated so will rech in 6 weeks with thyroid

## 2024-06-06 ENCOUNTER — HOSPITAL ENCOUNTER (OUTPATIENT)
Dept: CARDIOLOGY | Facility: HOSPITAL | Age: 49
Discharge: HOME | End: 2024-06-06
Payer: COMMERCIAL

## 2024-06-06 VITALS
SYSTOLIC BLOOD PRESSURE: 103 MMHG | BODY MASS INDEX: 26.36 KG/M2 | HEART RATE: 76 BPM | WEIGHT: 164 LBS | HEIGHT: 66 IN | DIASTOLIC BLOOD PRESSURE: 61 MMHG

## 2024-06-06 DIAGNOSIS — R00.2 PALPITATIONS: ICD-10-CM

## 2024-06-06 PROCEDURE — 93356 MYOCRD STRAIN IMG SPCKL TRCK: CPT | Performed by: INTERNAL MEDICINE

## 2024-06-06 PROCEDURE — 93306 TTE W/DOPPLER COMPLETE: CPT

## 2024-06-06 PROCEDURE — 93306 TTE W/DOPPLER COMPLETE: CPT | Performed by: INTERNAL MEDICINE

## 2024-06-06 PROCEDURE — 93356 MYOCRD STRAIN IMG SPCKL TRCK: CPT

## 2024-06-07 LAB
BH CV ECHO LEFT VENTRICLE GLOBAL LONGITUDINAL STRAIN: 17.6 %
BH CV ECHO MEAS - ACS: 2.09 CM
BH CV ECHO MEAS - AO MAX PG: 4.4 MMHG
BH CV ECHO MEAS - AO MEAN PG: 2.04 MMHG
BH CV ECHO MEAS - AO ROOT DIAM: 3.1 CM
BH CV ECHO MEAS - AO V2 MAX: 105.4 CM/SEC
BH CV ECHO MEAS - AO V2 VTI: 19.5 CM
BH CV ECHO MEAS - AVA(I,D): 3.2 CM2
BH CV ECHO MEAS - EDV(CUBED): 97.4 ML
BH CV ECHO MEAS - EDV(MOD-SP2): 100.9 ML
BH CV ECHO MEAS - EDV(MOD-SP4): 90.7 ML
BH CV ECHO MEAS - EF(MOD-BP): 62 %
BH CV ECHO MEAS - EF(MOD-SP2): 65.2 %
BH CV ECHO MEAS - EF(MOD-SP4): 58.9 %
BH CV ECHO MEAS - ESV(CUBED): 23.3 ML
BH CV ECHO MEAS - ESV(MOD-SP2): 35.1 ML
BH CV ECHO MEAS - ESV(MOD-SP4): 37.3 ML
BH CV ECHO MEAS - FS: 37.9 %
BH CV ECHO MEAS - IVS/LVPW: 1.19 CM
BH CV ECHO MEAS - IVSD: 0.91 CM
BH CV ECHO MEAS - LA DIMENSION: 3.5 CM
BH CV ECHO MEAS - LV DIASTOLIC VOL/BSA (35-75): 49.3 CM2
BH CV ECHO MEAS - LV MASS(C)D: 125.6 GRAMS
BH CV ECHO MEAS - LV MAX PG: 4.1 MMHG
BH CV ECHO MEAS - LV MEAN PG: 2.2 MMHG
BH CV ECHO MEAS - LV SYSTOLIC VOL/BSA (12-30): 20.3 CM2
BH CV ECHO MEAS - LV V1 MAX: 101.7 CM/SEC
BH CV ECHO MEAS - LV V1 VTI: 18.4 CM
BH CV ECHO MEAS - LVIDD: 4.6 CM
BH CV ECHO MEAS - LVIDS: 2.9 CM
BH CV ECHO MEAS - LVOT AREA: 3.4 CM2
BH CV ECHO MEAS - LVOT DIAM: 2.08 CM
BH CV ECHO MEAS - LVPWD: 0.77 CM
BH CV ECHO MEAS - MV A MAX VEL: 57 CM/SEC
BH CV ECHO MEAS - MV DEC SLOPE: 353.5 CM/SEC2
BH CV ECHO MEAS - MV DEC TIME: 0.18 SEC
BH CV ECHO MEAS - MV E MAX VEL: 64.4 CM/SEC
BH CV ECHO MEAS - MV E/A: 1.13
BH CV ECHO MEAS - MV MAX PG: 2.04 MMHG
BH CV ECHO MEAS - MV MEAN PG: 0.82 MMHG
BH CV ECHO MEAS - MV V2 VTI: 18.5 CM
BH CV ECHO MEAS - MVA(VTI): 3.4 CM2
BH CV ECHO MEAS - PA ACC TIME: 0.13 SEC
BH CV ECHO MEAS - PA V2 MAX: 74.8 CM/SEC
BH CV ECHO MEAS - PI END-D VEL: 90.2 CM/SEC
BH CV ECHO MEAS - PULM A REVS DUR: 0.11 SEC
BH CV ECHO MEAS - PULM A REVS VEL: 31.5 CM/SEC
BH CV ECHO MEAS - PULM DIAS VEL: 33.6 CM/SEC
BH CV ECHO MEAS - PULM S/D: 1.21
BH CV ECHO MEAS - PULM SYS VEL: 40.8 CM/SEC
BH CV ECHO MEAS - QP/QS: 0.95
BH CV ECHO MEAS - RAP SYSTOLE: 3 MMHG
BH CV ECHO MEAS - RV MAX PG: 1.31 MMHG
BH CV ECHO MEAS - RV V1 MAX: 57.3 CM/SEC
BH CV ECHO MEAS - RV V1 VTI: 12.4 CM
BH CV ECHO MEAS - RVDD: 2.9 CM
BH CV ECHO MEAS - RVOT DIAM: 2.46 CM
BH CV ECHO MEAS - RVSP: 15 MMHG
BH CV ECHO MEAS - SV(LVOT): 62.5 ML
BH CV ECHO MEAS - SV(MOD-SP2): 65.8 ML
BH CV ECHO MEAS - SV(MOD-SP4): 53.4 ML
BH CV ECHO MEAS - SV(RVOT): 59.2 ML
BH CV ECHO MEAS - SVI(LVOT): 34 ML/M2
BH CV ECHO MEAS - SVI(MOD-SP2): 35.8 ML/M2
BH CV ECHO MEAS - SVI(MOD-SP4): 29.1 ML/M2
BH CV ECHO MEAS - TR MAX PG: 11.9 MMHG
BH CV ECHO MEAS - TR MAX VEL: 172.1 CM/SEC

## 2024-06-10 ENCOUNTER — PATIENT MESSAGE (OUTPATIENT)
Dept: FAMILY MEDICINE CLINIC | Facility: CLINIC | Age: 49
End: 2024-06-10
Payer: COMMERCIAL

## 2024-06-13 ENCOUNTER — TELEPHONE (OUTPATIENT)
Dept: FAMILY MEDICINE CLINIC | Facility: CLINIC | Age: 49
End: 2024-06-13
Payer: COMMERCIAL

## 2024-06-23 DIAGNOSIS — G89.4 CHRONIC PAIN SYNDROME: ICD-10-CM

## 2024-06-24 NOTE — TELEPHONE ENCOUNTER
Rx Refill Note  Requested Prescriptions     Pending Prescriptions Disp Refills    busPIRone (BUSPAR) 5 MG tablet [Pharmacy Med Name: BUSPIRONE HCL 5 MG TABLET] 30 tablet 0     Sig: TAKE 1/2 TO 1 TABLET BY MOUTH 3 TIMES A DAY AS NEEDED FOR ANXIETY AND MAY INCREASE BY 5 MG EVERY 3 DAYS TO MAX OF 10 MG 3 TIMES A DAY    gabapentin (NEURONTIN) 800 MG tablet [Pharmacy Med Name: GABAPENTIN 800 MG TABLET] 30 tablet 0     Sig: TAKE 1 TABLET BY MOUTH EVERY DAY AT NIGHT      Last office visit with prescribing clinician: 5/16/2024   Last telemedicine visit with prescribing clinician: Visit date not found   Next office visit with prescribing clinician: Visit date not found     Office Visit with Elise Freire DO (05/16/2024)   Comprehensive Metabolic Panel (05/16/2024 11:43)  T4, Free (05/16/2024 11:43)  TSH (05/16/2024 11:43)  Lipid Panel (05/16/2024 11:43)                    Would you like a call back once the refill request has been completed: [] Yes [] No    If the office needs to give you a call back, can they leave a voicemail: [] Yes [] No    Logan Radford  06/24/24, 10:50 EDT

## 2024-06-26 RX ORDER — BUSPIRONE HYDROCHLORIDE 5 MG/1
TABLET ORAL
Qty: 30 TABLET | Refills: 0 | OUTPATIENT
Start: 2024-06-26

## 2024-06-26 RX ORDER — GABAPENTIN 800 MG/1
800 TABLET ORAL
Qty: 30 TABLET | Refills: 0 | OUTPATIENT
Start: 2024-06-26

## 2024-09-27 ENCOUNTER — TELEPHONE (OUTPATIENT)
Dept: FAMILY MEDICINE CLINIC | Facility: CLINIC | Age: 49
End: 2024-09-27
Payer: COMMERCIAL

## 2024-09-27 RX ORDER — OMEPRAZOLE 40 MG/1
40 CAPSULE, DELAYED RELEASE ORAL DAILY
Qty: 90 CAPSULE | Refills: 0 | Status: SHIPPED | OUTPATIENT
Start: 2024-09-27

## 2024-09-27 NOTE — TELEPHONE ENCOUNTER
We received a fax from [facility] in [place] stating that they need an alternative for Omeprazole DR 20mg capsules as it is not on her formulary.   (This was not a PA request)

## 2024-11-01 RX ORDER — LEVOTHYROXINE SODIUM 50 UG/1
50 TABLET ORAL DAILY
Qty: 90 TABLET | Refills: 0 | OUTPATIENT
Start: 2024-11-01

## 2024-11-01 NOTE — TELEPHONE ENCOUNTER
Rx Refill Note  Requested Prescriptions     Pending Prescriptions Disp Refills    levothyroxine (SYNTHROID, LEVOTHROID) 50 MCG tablet [Pharmacy Med Name: LEVOTHYROXINE 50 MCG TABLET] 90 tablet 0     Sig: TAKE 1 TABLET BY MOUTH EVERY DAY      Last office visit with prescribing clinician: 5/16/2024   Last telemedicine visit with prescribing clinician: Visit date not found   Next office visit with prescribing clinician: Visit date not found        TSH (05/16/2024 11:43)                  Would you like a call back once the refill request has been completed: [] Yes [] No    If the office needs to give you a call back, can they leave a voicemail: [] Yes [] No    June Rucker, RT  11/01/24, 08:26 EDT

## 2024-11-07 ENCOUNTER — CLINICAL SUPPORT (OUTPATIENT)
Dept: FAMILY MEDICINE CLINIC | Facility: CLINIC | Age: 49
End: 2024-11-07
Payer: COMMERCIAL

## 2024-11-07 DIAGNOSIS — E03.9 ACQUIRED HYPOTHYROIDISM: ICD-10-CM

## 2024-11-07 DIAGNOSIS — R74.8 ELEVATED LIVER ENZYMES: ICD-10-CM

## 2024-11-07 PROCEDURE — 36415 COLL VENOUS BLD VENIPUNCTURE: CPT | Performed by: FAMILY MEDICINE

## 2024-11-07 PROCEDURE — 80053 COMPREHEN METABOLIC PANEL: CPT | Performed by: FAMILY MEDICINE

## 2024-11-07 PROCEDURE — 84439 ASSAY OF FREE THYROXINE: CPT | Performed by: FAMILY MEDICINE

## 2024-11-07 PROCEDURE — 84443 ASSAY THYROID STIM HORMONE: CPT | Performed by: FAMILY MEDICINE

## 2024-11-07 NOTE — PROGRESS NOTES
Venipuncture performed in L ARM by RT Leonor  with good hemostasis. Patient tolerated well. 11/07/24 June Rucker, RT

## 2024-11-08 DIAGNOSIS — G89.4 CHRONIC PAIN SYNDROME: ICD-10-CM

## 2024-11-08 LAB
ALBUMIN SERPL-MCNC: 3.9 G/DL (ref 3.5–5.2)
ALBUMIN/GLOB SERPL: 1.3 G/DL
ALP SERPL-CCNC: 141 U/L (ref 39–117)
ALT SERPL W P-5'-P-CCNC: 106 U/L (ref 1–33)
ANION GAP SERPL CALCULATED.3IONS-SCNC: 5.9 MMOL/L (ref 5–15)
AST SERPL-CCNC: 69 U/L (ref 1–32)
BILIRUB SERPL-MCNC: 0.5 MG/DL (ref 0–1.2)
BUN SERPL-MCNC: 10 MG/DL (ref 6–20)
BUN/CREAT SERPL: 10.1 (ref 7–25)
CALCIUM SPEC-SCNC: 8.4 MG/DL (ref 8.6–10.5)
CHLORIDE SERPL-SCNC: 108 MMOL/L (ref 98–107)
CO2 SERPL-SCNC: 27.1 MMOL/L (ref 22–29)
CREAT SERPL-MCNC: 0.99 MG/DL (ref 0.57–1)
EGFRCR SERPLBLD CKD-EPI 2021: 70 ML/MIN/1.73
GLOBULIN UR ELPH-MCNC: 3.1 GM/DL
GLUCOSE SERPL-MCNC: 115 MG/DL (ref 65–99)
POTASSIUM SERPL-SCNC: 4.2 MMOL/L (ref 3.5–5.2)
PROT SERPL-MCNC: 7 G/DL (ref 6–8.5)
SODIUM SERPL-SCNC: 141 MMOL/L (ref 136–145)
T4 FREE SERPL-MCNC: 1.18 NG/DL (ref 0.92–1.68)
TSH SERPL DL<=0.05 MIU/L-ACNC: 3.44 UIU/ML (ref 0.27–4.2)

## 2024-11-08 RX ORDER — DULOXETIN HYDROCHLORIDE 60 MG/1
60 CAPSULE, DELAYED RELEASE ORAL DAILY
Qty: 90 CAPSULE | Refills: 1 | Status: SHIPPED | OUTPATIENT
Start: 2024-11-08

## 2024-11-08 NOTE — TELEPHONE ENCOUNTER
Rx Refill Note  Requested Prescriptions     Pending Prescriptions Disp Refills    DULoxetine (CYMBALTA) 60 MG capsule [Pharmacy Med Name: DULOXETINE HCL DR 60 MG CAP] 90 capsule 1     Sig: TAKE 1 CAPSULE BY MOUTH EVERY DAY      Last office visit with prescribing clinician: 5/16/2024   Last telemedicine visit with prescribing clinician: Visit date not found   Next office visit with prescribing clinician: Visit date not found                         Would you like a call back once the refill request has been completed: [] Yes [] No    If the office needs to give you a call back, can they leave a voicemail: [] Yes [] No    Darlyn Galvin CMA  11/08/24, 08:42 EST

## 2024-11-09 DIAGNOSIS — R74.8 ELEVATED LIVER ENZYMES: Primary | ICD-10-CM

## 2024-11-09 RX ORDER — LEVOTHYROXINE SODIUM 88 UG/1
88 TABLET ORAL DAILY
Qty: 90 TABLET | Refills: 1 | Status: SHIPPED | OUTPATIENT
Start: 2024-11-09

## 2024-11-11 ENCOUNTER — PATIENT MESSAGE (OUTPATIENT)
Dept: FAMILY MEDICINE CLINIC | Facility: CLINIC | Age: 49
End: 2024-11-11
Payer: COMMERCIAL

## 2024-11-14 NOTE — TELEPHONE ENCOUNTER
Pt states she's not taking Excedrin anymore, but she has been taking Aleve...is that ok?    Also, ok to increase thyroid med. She will check her schedule and call back for lab appt before 9 AM.

## 2024-11-18 NOTE — TELEPHONE ENCOUNTER
From  Elise Freire,  To  Taylor Cat Sent and Delivered  11/14/2024  3:07 PM       Stop all NSAIDS---Ibuprofen/ aleve      LVM informing pt

## 2024-11-23 ENCOUNTER — HOSPITAL ENCOUNTER (OUTPATIENT)
Dept: ULTRASOUND IMAGING | Facility: HOSPITAL | Age: 49
Discharge: HOME | End: 2024-11-23
Payer: COMMERCIAL

## 2024-11-23 DIAGNOSIS — R74.8 ELEVATED LIVER ENZYMES: ICD-10-CM

## 2024-11-23 PROCEDURE — 76705 ECHO EXAM OF ABDOMEN: CPT

## 2024-11-27 ENCOUNTER — PATIENT MESSAGE (OUTPATIENT)
Dept: FAMILY MEDICINE CLINIC | Facility: CLINIC | Age: 49
End: 2024-11-27
Payer: COMMERCIAL

## 2025-01-03 RX ORDER — OMEPRAZOLE 40 MG/1
40 CAPSULE, DELAYED RELEASE ORAL DAILY
Qty: 90 CAPSULE | Refills: 0 | Status: SHIPPED | OUTPATIENT
Start: 2025-01-03

## 2025-02-18 NOTE — TELEPHONE ENCOUNTER
HUB to relay  Looks like  sent this in for the 40mg as they would not cover the 20mg    ,DirectAddress_Unknown

## 2025-04-11 RX ORDER — OMEPRAZOLE 40 MG/1
40 CAPSULE, DELAYED RELEASE ORAL DAILY
Qty: 90 CAPSULE | Refills: 0 | Status: SHIPPED | OUTPATIENT
Start: 2025-04-11

## 2025-04-11 NOTE — TELEPHONE ENCOUNTER
Rx Refill Note  Requested Prescriptions     Pending Prescriptions Disp Refills    omeprazole (priLOSEC) 40 MG capsule [Pharmacy Med Name: OMEPRAZOLE DR 40 MG CAPSULE] 90 capsule 0     Sig: TAKE 1 CAPSULE BY MOUTH EVERY DAY      Last office visit with prescribing clinician: 5/16/2024   Last telemedicine visit with prescribing clinician: Visit date not found   Next office visit with prescribing clinician: Visit date not found                         Would you like a call back once the refill request has been completed: [] Yes [] No    If the office needs to give you a call back, can they leave a voicemail: [] Yes [] No    Darlyn Galvin CMA  04/11/25, 08:27 EDT

## 2025-04-13 NOTE — TELEPHONE ENCOUNTER
Rx Refill Note  Requested Prescriptions     Pending Prescriptions Disp Refills   • gabapentin (NEURONTIN) 300 MG capsule [Pharmacy Med Name: GABAPENTIN 300 MG CAPSULE] 90 capsule 5     Sig: TAKE 1 CAPSULE BY MOUTH THREE TIMES A DAY      Last office visit with prescribing clinician: 3/2/2023   Last telemedicine visit with prescribing clinician: 9/7/2023   Next office visit with prescribing clinician: 9/7/2023                         Would you like a call back once the refill request has been completed: [] Yes [] No    If the office needs to give you a call back, can they leave a voicemail: [] Yes [] No    Zoila Novak MA  04/17/23, 07:53 EDT  
Patient requests all Lab, Cardiology, and Radiology Results on their Discharge Instructions

## 2025-06-09 RX ORDER — MONTELUKAST SODIUM 10 MG/1
10 TABLET ORAL
Qty: 90 TABLET | Refills: 3 | OUTPATIENT
Start: 2025-06-09

## 2025-06-09 NOTE — TELEPHONE ENCOUNTER
Rx Refill Note  Requested Prescriptions     Pending Prescriptions Disp Refills    montelukast (SINGULAIR) 10 MG tablet [Pharmacy Med Name: MONTELUKAST SOD 10 MG TABLET] 90 tablet 3     Sig: TAKE 1 TABLET BY MOUTH EVERY DAY AT NIGHT      Last office visit with prescribing clinician: 5/16/2024   Last telemedicine visit with prescribing clinician: Visit date not found   Next office visit with prescribing clinician: Visit date not found        Lipid Panel (05/16/2024 11:43)                  Would you like a call back once the refill request has been completed: [] Yes [] No    If the office needs to give you a call back, can they leave a voicemail: [] Yes [] No    June Rucker, RT  06/09/25, 10:11 EDT

## 2025-06-10 NOTE — TELEPHONE ENCOUNTER
RELAY    Patient needs an appt with Dr. Freire per Dr. Freire.    Attempted to call patient to schedule but no answer and VM was full.

## 2025-07-14 RX ORDER — OMEPRAZOLE 40 MG/1
40 CAPSULE, DELAYED RELEASE ORAL DAILY
Qty: 90 CAPSULE | Refills: 0 | OUTPATIENT
Start: 2025-07-14

## 2025-07-14 NOTE — TELEPHONE ENCOUNTER
Rx Refill Note  Requested Prescriptions     Pending Prescriptions Disp Refills    omeprazole (priLOSEC) 40 MG capsule [Pharmacy Med Name: OMEPRAZOLE DR 40 MG CAPSULE] 90 capsule 0     Sig: TAKE 1 CAPSULE BY MOUTH EVERY DAY      Last office visit with prescribing clinician: 5/16/2024   Last telemedicine visit with prescribing clinician: Visit date not found   Next office visit with prescribing clinician: Visit date not found     Comprehensive Metabolic Panel (11/07/2024 14:59)  Lipid Panel (05/16/2024 11:43)                     Would you like a call back once the refill request has been completed: [] Yes [] No    If the office needs to give you a call back, can they leave a voicemail: [] Yes [] No    Darlyn Galvin, MANOJ  07/14/25, 09:54 EDT

## 2025-07-18 ENCOUNTER — TELEPHONE (OUTPATIENT)
Dept: FAMILY MEDICINE CLINIC | Facility: CLINIC | Age: 50
End: 2025-07-18

## 2025-07-18 ENCOUNTER — OFFICE VISIT (OUTPATIENT)
Dept: FAMILY MEDICINE CLINIC | Facility: CLINIC | Age: 50
End: 2025-07-18
Payer: COMMERCIAL

## 2025-07-18 VITALS
SYSTOLIC BLOOD PRESSURE: 126 MMHG | TEMPERATURE: 98.4 F | HEIGHT: 66 IN | BODY MASS INDEX: 26.36 KG/M2 | HEART RATE: 98 BPM | OXYGEN SATURATION: 96 % | DIASTOLIC BLOOD PRESSURE: 87 MMHG | WEIGHT: 164 LBS

## 2025-07-18 DIAGNOSIS — Z12.31 SCREENING MAMMOGRAM FOR BREAST CANCER: ICD-10-CM

## 2025-07-18 DIAGNOSIS — R13.13 PHARYNGEAL DYSPHAGIA: ICD-10-CM

## 2025-07-18 DIAGNOSIS — K21.9 GASTROESOPHAGEAL REFLUX DISEASE WITHOUT ESOPHAGITIS: ICD-10-CM

## 2025-07-18 DIAGNOSIS — Z01.84 IMMUNITY STATUS TESTING: ICD-10-CM

## 2025-07-18 DIAGNOSIS — Z12.11 SCREENING FOR COLON CANCER: ICD-10-CM

## 2025-07-18 DIAGNOSIS — F32.A ANXIETY AND DEPRESSION: Primary | ICD-10-CM

## 2025-07-18 DIAGNOSIS — R74.8 ELEVATED LIVER ENZYMES: ICD-10-CM

## 2025-07-18 DIAGNOSIS — R23.2 HOT FLASHES: ICD-10-CM

## 2025-07-18 DIAGNOSIS — F41.9 ANXIETY AND DEPRESSION: Primary | ICD-10-CM

## 2025-07-18 DIAGNOSIS — E03.9 ACQUIRED HYPOTHYROIDISM: ICD-10-CM

## 2025-07-18 PROCEDURE — 86765 RUBEOLA ANTIBODY: CPT | Performed by: FAMILY MEDICINE

## 2025-07-18 PROCEDURE — 86735 MUMPS ANTIBODY: CPT | Performed by: FAMILY MEDICINE

## 2025-07-18 PROCEDURE — 80053 COMPREHEN METABOLIC PANEL: CPT | Performed by: FAMILY MEDICINE

## 2025-07-18 PROCEDURE — 83001 ASSAY OF GONADOTROPIN (FSH): CPT | Performed by: FAMILY MEDICINE

## 2025-07-18 PROCEDURE — 86762 RUBELLA ANTIBODY: CPT | Performed by: FAMILY MEDICINE

## 2025-07-18 PROCEDURE — 83002 ASSAY OF GONADOTROPIN (LH): CPT | Performed by: FAMILY MEDICINE

## 2025-07-18 PROCEDURE — 82670 ASSAY OF TOTAL ESTRADIOL: CPT | Performed by: FAMILY MEDICINE

## 2025-07-18 RX ORDER — ALBUTEROL SULFATE 90 UG/1
2 INHALANT RESPIRATORY (INHALATION) EVERY 6 HOURS PRN
Qty: 18 G | Refills: 0 | Status: SHIPPED | OUTPATIENT
Start: 2025-07-18

## 2025-07-18 RX ORDER — OMEPRAZOLE 40 MG/1
40 CAPSULE, DELAYED RELEASE ORAL DAILY
Qty: 90 CAPSULE | Refills: 0 | Status: CANCELLED | OUTPATIENT
Start: 2025-07-18

## 2025-07-18 RX ORDER — MONTELUKAST SODIUM 10 MG/1
10 TABLET ORAL NIGHTLY
Qty: 90 TABLET | Refills: 3 | Status: CANCELLED | OUTPATIENT
Start: 2025-07-18

## 2025-07-18 RX ORDER — MONTELUKAST SODIUM 10 MG/1
10 TABLET ORAL NIGHTLY
Qty: 90 TABLET | Refills: 3 | Status: SHIPPED | OUTPATIENT
Start: 2025-07-18

## 2025-07-18 RX ORDER — DULOXETIN HYDROCHLORIDE 30 MG/1
30 CAPSULE, DELAYED RELEASE ORAL DAILY
Qty: 30 CAPSULE | Refills: 0 | Status: SHIPPED | OUTPATIENT
Start: 2025-07-18

## 2025-07-18 RX ORDER — LEVOTHYROXINE SODIUM 88 UG/1
88 TABLET ORAL DAILY
Qty: 90 TABLET | Refills: 0 | Status: SHIPPED | OUTPATIENT
Start: 2025-07-18

## 2025-07-18 RX ORDER — BUSPIRONE HYDROCHLORIDE 5 MG/1
TABLET ORAL
Qty: 30 TABLET | Refills: 0 | Status: SHIPPED | OUTPATIENT
Start: 2025-07-18

## 2025-07-18 RX ORDER — OMEPRAZOLE 40 MG/1
40 CAPSULE, DELAYED RELEASE ORAL DAILY
Qty: 90 CAPSULE | Refills: 0 | Status: SHIPPED | OUTPATIENT
Start: 2025-07-18

## 2025-07-18 RX ORDER — LEVOTHYROXINE SODIUM 88 UG/1
88 TABLET ORAL DAILY
Qty: 90 TABLET | Refills: 1 | Status: CANCELLED | OUTPATIENT
Start: 2025-07-18

## 2025-07-18 RX ORDER — DULOXETIN HYDROCHLORIDE 60 MG/1
60 CAPSULE, DELAYED RELEASE ORAL DAILY
Qty: 90 CAPSULE | Refills: 1 | Status: CANCELLED | OUTPATIENT
Start: 2025-07-18

## 2025-07-18 NOTE — PROGRESS NOTES
Subjective   Taylor Cat is a 49 y.o. female.     Chief Complaint   Patient presents with    Earache     RT ear    Allergic Rhinitis    Heartburn    Anxiety    Hypothyroidism         Current Outpatient Medications:     albuterol sulfate  (90 Base) MCG/ACT inhaler, Inhale 2 puffs Every 6 (Six) Hours As Needed for Wheezing., Disp: 18 g, Rfl: 0    Atogepant (Qulipta) 60 MG tablet, Take 1 tablet by mouth Daily., Disp: 14 tablet, Rfl: 0    busPIRone (BUSPAR) 5 MG tablet, 1/2 tab - 1 po tid prn anxiety and may increase by 5mg q3days prn anxiety to max of 10mg TID, Disp: 30 tablet, Rfl: 0    DULoxetine (CYMBALTA) 30 MG capsule, Take 1 capsule by mouth Daily., Disp: 30 capsule, Rfl: 0    fexofenadine (ALLEGRA) 180 MG tablet, Take 1 tablet by mouth Daily., Disp: , Rfl:     gabapentin (NEURONTIN) 800 MG tablet, Take 1 tablet by mouth Every Night., Disp: 30 tablet, Rfl: 0    levothyroxine (SYNTHROID, LEVOTHROID) 88 MCG tablet, Take 1 tablet by mouth Daily., Disp: 90 tablet, Rfl: 0    methocarbamol (ROBAXIN) 500 MG tablet, Take 1 tablet by mouth 3 (Three) Times a Day., Disp: 60 tablet, Rfl: 2    montelukast (Singulair) 10 MG tablet, Take 1 tablet by mouth Every Night., Disp: 90 tablet, Rfl: 3    omeprazole (priLOSEC) 40 MG capsule, Take 1 capsule by mouth Daily., Disp: 90 capsule, Rfl: 0    ondansetron ODT (ZOFRAN-ODT) 4 MG disintegrating tablet, PLACE 1 TABLET ON THE TONGUE EVERY 8 HOURS AS NEEDED FOR NAUSEA AND VOMITING, Disp: 30 tablet, Rfl: 0    rizatriptan MLT (MAXALT-MLT) 10 MG disintegrating tablet, TAKE 1 TABLET BY MOUTH AS NEEDED FOR MIGRAINES, MAY REPEAT EVERY 2 HOURS, MAX 3 A DAY, Disp: 6 tablet, Rfl: 3    Past Medical History:   Diagnosis Date    Allergic     Anemia     Anxiety     Arthritis     Asthma     Cervical Cancer     Chronic back pain     COVID-19 2022    DDD, lumbar     Depression     Endometriosis     GERD     Hemorrhoids     Hypothyroidism     IBS     Insomnia     Lumbar radiculitis     MAMMO      NEG = 2016/ 2020/ 2022   Dr. Crooks    Migraine     Myofasciitis     PAP     NEG = 2019    Spondylosis     T12 Vert Fx     T12 from trauma       Past Surgical History:   Procedure Laterality Date    COLONOSCOPY      COLOGUARD-----NEG = 2022, rech 2025    CYST REMOVAL      Neck cyst sx as child    D & C AND LAPAROSCOPY      SUBTOTAL HYSTERECTOMY      TUBAL ABDOMINAL LIGATION  2001       Family History   Problem Relation Age of Onset    Arthritis Mother     Heart disease Mother     Thyroid disease Mother         Svt    Anxiety disorder Mother         Chrohns heart problems high blood pressure    Hyperlipidemia Mother     Stroke Father     Heart disease Father         Deseased    Alcohol abuse Father         Deseased 2018    Depression Father     Alcohol abuse Sister         Drugs alcohol abuse    Drug abuse Sister     Irritable bowel syndrome Brother     Thyroid disease Maternal Grandmother     Thyroid disease Maternal Grandfather     Anxiety disorder Maternal Grandfather         Deseased    Heart disease Maternal Grandfather         Deseased    Anxiety disorder Paternal Grandmother         Deseased    Arthritis Paternal Grandmother     Cancer Paternal Grandmother         Skin Cancer/ ?Cervical Cancer    Depression Paternal Grandmother     Jyoti's disease Paternal Grandmother     Dementia Paternal Grandmother     Heart disease Paternal Grandfather         Deseased    Hyperlipidemia Paternal Grandfather     Hypertension Paternal Grandfather     Anxiety disorder Daughter     Thyroid disease Daughter         Svt/  allergies    Anxiety disorder Daughter     Developmental Disability Paternal Uncle         Muscular distrafie/    Learning disabilities Paternal Uncle         Ceburalpulsy/ muscular distrafie       Social History     Socioeconomic History    Marital status:    Tobacco Use    Smoking status: Former     Current packs/day: 0.00     Average packs/day: 0.3 packs/day for 4.0 years (1.0 ttl pk-yrs)      Types: Cigarettes     Start date: 1/1/2006     Quit date: 1/1/2010     Years since quitting: 15.5     Passive exposure: Past    Smokeless tobacco: Never   Vaping Use    Vaping status: Former   Substance and Sexual Activity    Alcohol use: No    Drug use: No    Sexual activity: Not Currently     Partners: Male       History of Present Illness  The patient is a 49-year-old female who presents for annual follow-up visit.    She has not had a mammogram since 2022 and is due for one. Her last Pap smear was in 2019. She has not yet completed her Cologuard test for this year.     She has a history of cervical cancer, which was treated with cryotherapy. She underwent a hysterectomy due to multiple health issues and miscarriages. She has a history of chickenpox, having contracted it twice. She is unsure about her MMR status and would like to have it checked. She has not been tested for menopause but has been having some hot flashes.    She is on Singulair year-round for allergies and asthma. She uses an albuterol inhaler for her asthma, which is allergy-induced and exacerbated by heat. The rescue inhaler has been effective when used.    She is on Cymbalta for anxiety but feels it is not very effective. She has not tried BuSpar. She occasionally experiences crying spells and is unsure if it is due to depression or hormonal changes. She has noticed twitching in her mouth and changes in her speech off/on, which she attributes to anxiety.    She has been experiencing difficulty swallowing, with food getting stuck in her throat, although she can eventually swallow it down. She has to drink a lot of fluids to help with swallowing. The issue is usually located at the base of her throat, but it was closer to the top last time. She is on omeprazole for heartburn, but it does not seem to be controlling her symptoms effectively. She has more problems with it at night and tries to prop herself up. She follows a heartburn diet, but  sometimes it is hard. She takes omeprazole in the morning before work as she works night shifts. She does not eat before bed and tries to stay up for an hour or so after eating.    She has not had any recent blood work done. She has not been taking Excedrin. She has not increased her thyroid medication dosage or had it rechecked in 2 to 3 months as previously recommended.    Social History:  Alcohol: She reports no alcohol consumption.  Tobacco: She reports quitting smoking.  Recreational Drugs: She reports no use of recreational drugs.  Sleep: She works night shifts and takes omeprazole in the morning before work.    PAST SURGICAL HISTORY:  Subtotal hysterectomy  Tubal ligation  Dilation and curettage (D&C)  Colonoscopy  Cryotherapy for cervical cancer    FAMILY HISTORY  Her father was an alcoholic. Her sister has a history of drug and alcohol use.        The following portions of the patient's history were reviewed and updated as appropriate: allergies, current medications, past family history, past medical history, past social history, past surgical history and problem list.    Review of Systems   Constitutional:  Negative for activity change, appetite change, unexpected weight gain and unexpected weight loss.   HENT:  Positive for trouble swallowing.    Eyes:  Negative for blurred vision.   Respiratory:  Negative for cough, shortness of breath and wheezing.    Cardiovascular:  Positive for palpitations.   Gastrointestinal:  Positive for GERD. Negative for constipation, diarrhea, nausea and vomiting.   Allergic/Immunologic: Positive for environmental allergies.   Neurological:  Negative for dizziness and confusion.   Psychiatric/Behavioral:  Positive for depressed mood. Negative for sleep disturbance. The patient is nervous/anxious.        Vitals:    07/18/25 1405   BP: 126/87   Pulse: 98   Temp: 98.4 °F (36.9 °C)   SpO2: 96%       Objective   Physical Exam  Vitals and nursing note reviewed.   Constitutional:        General: She is not in acute distress.     Appearance: She is well-developed. She is not ill-appearing or toxic-appearing.   HENT:      Head: Normocephalic and atraumatic.   Cardiovascular:      Rate and Rhythm: Normal rate and regular rhythm.      Heart sounds: Normal heart sounds. No murmur heard.  Pulmonary:      Effort: Pulmonary effort is normal. No respiratory distress.      Breath sounds: Normal breath sounds. No wheezing, rhonchi or rales.   Musculoskeletal:      Cervical back: Normal range of motion and neck supple.   Skin:     General: Skin is warm and dry.      Findings: No rash.   Neurological:      Mental Status: She is alert and oriented to person, place, and time.      Cranial Nerves: No cranial nerve deficit.      Gait: Gait normal.   Psychiatric:         Attention and Perception: Attention and perception normal.         Mood and Affect: Mood and affect normal.         Speech: Speech normal.         Behavior: Behavior normal. Behavior is cooperative.         Thought Content: Thought content normal.         Cognition and Memory: Cognition normal.         Judgment: Judgment normal.       Physical Exam       BMI is >= 25 and <30. (Overweight) The following options were offered after discussion;: exercise counseling/recommendations and nutrition counseling/recommendations      Assessment & Plan   Diagnoses and all orders for this visit:    1. Anxiety and depression (Primary)  -     DULoxetine (CYMBALTA) 30 MG capsule; Take 1 capsule by mouth Daily.  Dispense: 30 capsule; Refill: 0    2. Pharyngeal dysphagia    3. Gastroesophageal reflux disease without esophagitis    4. Acquired hypothyroidism  -     TSH  -     T4, Free  -     Estradiol  -     FSH & LH    5. Hot flashes  -     Cancel: FSH & LH; Future  -     Cancel: Estradiol; Future  -     Estradiol  -     FSH & LH    6. Elevated liver enzymes  -     Comprehensive Metabolic Panel    7. Immunity status testing  -     Measles / Mumps / Rubella Immunity;  Future  -     Measles / Mumps / Rubella Immunity    8. Screening mammogram for breast cancer  -     Mammo Screening Digital Tomosynthesis Bilateral With CAD; Future    9. Screening for colon cancer  -     Cologuard - Stool, Per Rectum; Future    Other orders  -     levothyroxine (SYNTHROID, LEVOTHROID) 88 MCG tablet; Take 1 tablet by mouth Daily.  Dispense: 90 tablet; Refill: 0  -     omeprazole (priLOSEC) 40 MG capsule; Take 1 capsule by mouth Daily.  Dispense: 90 capsule; Refill: 0  -     montelukast (Singulair) 10 MG tablet; Take 1 tablet by mouth Every Night.  Dispense: 90 tablet; Refill: 3  -     busPIRone (BUSPAR) 5 MG tablet; 1/2 tab - 1 po tid prn anxiety and may increase by 5mg q3days prn anxiety to max of 10mg TID  Dispense: 30 tablet; Refill: 0  -     albuterol sulfate  (90 Base) MCG/ACT inhaler; Inhale 2 puffs Every 6 (Six) Hours As Needed for Wheezing.  Dispense: 18 g; Refill: 0      Assessment & Plan  1. Health Maintenance.  - She is due for a mammogram and Pap smear. Her last Pap smear was in 2019. She is also due for a Cologuard test, which was due in 02/2025.  - Given her history of asthma, she qualifies for an early pneumonia vaccine. At the age of 50, she will be eligible for the shingles vaccine, provided she had chickenpox in her childhood.  - Her liver enzymes were elevated in 11/2024. An MMR titer will be conducted today. Fasting labs will be ordered, including CMP and TSH, free T4, and MMR.  - A prescription for thyroid medication (88 mcg) will be provided, with instructions to take it for at least 6 to 8 weeks. Prescriptions for omeprazole, Singulair (montelukast), and an albuterol inhaler will be provided.    2. Anxiety.  - She reports that her current medication, duloxetine, is not effectively managing her anxiety symptoms.  - The potential side effects of Cymbalta were discussed, including dry mouth, nausea, weight loss, fatigue, constipation, anorexia, insomnia, vomiting,  diarrhea, sweating, and agitation.  - Her Cymbalta dosage will be reduced from 60 mg to 30 mg over the next month. She can take BuSpar (buspirone) up to three times daily for anxiety during this period.  - If BuSpar proves ineffective towards the end of her Cymbalta course, a switch to another medication will be considered.    3. Gastroesophageal Reflux Disease (GERD).  - She reports that her current medication, omeprazole, is not effectively managing her GERD symptoms.  - She is advised to switch the timing of her omeprazole intake to before bedtime to see if it helps with her symptoms.  - She follows a heartburn diet but sometimes finds it difficult. She takes omeprazole in the morning before work as she works night shifts.  - She does not eat before bed and tries to stay up for an hour or so after eating.  If dysphagia doesn't improve, she should call [facility] for appt    4. Asthma.  - She qualifies for an early pneumonia vaccine due to her asthma condition.  - A prescription for Singulair (montelukast) will be provided.  - A prescription for an albuterol inhaler will be provided.    5. Elevated liver enzymes.  - Her liver enzymes were elevated in 11/2024.  - Fasting labs will be ordered, including CMP and TSH, free T4, and MMR.  - If her liver enzymes remain elevated, additional tests will be scheduled along with fasting cholesterol.    6. Difficulty swallowing.  - She reports difficulty swallowing, with food getting stuck in her throat and needing to drink a lot of fluids to help with swallowing.  - The issue is usually located in the same spot, but it was closer to the top last time.  - She is on omeprazole for heartburn, but it does not seem to be controlling her symptoms effectively. She has more problems with it at night and tries to prop herself up.  - She follows a heartburn diet, but sometimes it is hard. She takes omeprazole in the morning before work as she works night shifts. She does not eat before bed  and tries to stay up for an hour or so after eating.     Results  Labs   - CMP: 11/2024, Elevated liver enzymes          Patient or patient representative verbalized consent for the use of Ambient Listening during the visit with  Elise Freire DO for chart documentation. 7/19/2025  14:37 EDT  Answers submitted by the patient for this visit:  Chronic Condition Follow-up (Submitted on 7/18/2025)  Chief Complaint: PCP follow-up  anxiety: Yes  depression: Yes  dry mouth: No  headaches: No  insomnia: Yes  Medication compliance: all of the time  Side effects: GI discomfort  Treatment barriers: no complaince problems  Exercise: intermittently  Sleep quality: poor  Sleep per night: 5 Hours  Additional anxiety information: Meds not helping much

## 2025-07-18 NOTE — TELEPHONE ENCOUNTER
Patient wanted to see if she is supposed to see Dr Crooks for PAP or do we wait on that. Okay to respond via [employer].

## 2025-07-18 NOTE — PROGRESS NOTES
Venipuncture Blood Specimen Collection  Venipuncture performed in LT arm by Verito Kaufman MA with good hemostasis. Patient tolerated the procedure well without complications.   07/18/25   Verito Kaufman MA

## 2025-07-19 ENCOUNTER — RESULTS FOLLOW-UP (OUTPATIENT)
Dept: FAMILY MEDICINE CLINIC | Facility: CLINIC | Age: 50
End: 2025-07-19
Payer: COMMERCIAL

## 2025-07-19 DIAGNOSIS — E78.1 HYPERTRIGLYCERIDEMIA: Primary | ICD-10-CM

## 2025-07-19 LAB
ALBUMIN SERPL-MCNC: 4.2 G/DL (ref 3.5–5.2)
ALBUMIN/GLOB SERPL: 1.5 G/DL
ALP SERPL-CCNC: 134 U/L (ref 39–117)
ALT SERPL W P-5'-P-CCNC: 40 U/L (ref 1–33)
ANION GAP SERPL CALCULATED.3IONS-SCNC: 12 MMOL/L (ref 5–15)
AST SERPL-CCNC: 40 U/L (ref 1–32)
BILIRUB SERPL-MCNC: 0.2 MG/DL (ref 0–1.2)
BUN SERPL-MCNC: 13 MG/DL (ref 6–20)
BUN/CREAT SERPL: 13.3 (ref 7–25)
CALCIUM SPEC-SCNC: 9.2 MG/DL (ref 8.6–10.5)
CHLORIDE SERPL-SCNC: 104 MMOL/L (ref 98–107)
CO2 SERPL-SCNC: 22 MMOL/L (ref 22–29)
CREAT SERPL-MCNC: 0.98 MG/DL (ref 0.57–1)
EGFRCR SERPLBLD CKD-EPI 2021: 70.9 ML/MIN/1.73
ESTRADIOL SERPL HS-MCNC: 15.7 PG/ML
FSH SERPL-ACNC: 74.2 MIU/ML
GLOBULIN UR ELPH-MCNC: 2.8 GM/DL
GLUCOSE SERPL-MCNC: 85 MG/DL (ref 65–99)
LH SERPL-ACNC: 41.6 MIU/ML
MEV IGG SER IA-ACNC: 97.9 AU/ML
MUV IGG SER IA-ACNC: 45.5 AU/ML
POTASSIUM SERPL-SCNC: 4 MMOL/L (ref 3.5–5.2)
PROT SERPL-MCNC: 7 G/DL (ref 6–8.5)
RUBV IGG SERPL IA-ACNC: 1.57 INDEX
SODIUM SERPL-SCNC: 138 MMOL/L (ref 136–145)

## 2025-07-28 NOTE — TELEPHONE ENCOUNTER
RELAY    Elise Freire, DO      Result Note  MMR= Immune  CMP-----sugar and kidneys good; liver enz mildly elevated------should consider checking liver US  FSH=in postmenopausal range   LH=could be in postmenopausal range  Estradiol= low enough to be in postmenopausal range    LVM informing pt

## 2025-07-31 ENCOUNTER — PATIENT MESSAGE (OUTPATIENT)
Dept: FAMILY MEDICINE CLINIC | Facility: CLINIC | Age: 50
End: 2025-07-31
Payer: COMMERCIAL

## 2025-08-13 DIAGNOSIS — F41.9 ANXIETY AND DEPRESSION: ICD-10-CM

## 2025-08-13 DIAGNOSIS — F32.A ANXIETY AND DEPRESSION: ICD-10-CM

## 2025-08-13 RX ORDER — DULOXETIN HYDROCHLORIDE 30 MG/1
30 CAPSULE, DELAYED RELEASE ORAL DAILY
Qty: 90 CAPSULE | Refills: 0 | Status: SHIPPED | OUTPATIENT
Start: 2025-08-13

## 2025-08-18 DIAGNOSIS — R74.8 ELEVATED LIVER ENZYMES: Primary | ICD-10-CM
